# Patient Record
Sex: FEMALE | Race: WHITE | NOT HISPANIC OR LATINO | Employment: FULL TIME | URBAN - METROPOLITAN AREA
[De-identification: names, ages, dates, MRNs, and addresses within clinical notes are randomized per-mention and may not be internally consistent; named-entity substitution may affect disease eponyms.]

---

## 2020-01-29 ENCOUNTER — TELEPHONE (OUTPATIENT)
Dept: FAMILY MEDICINE CLINIC | Facility: CLINIC | Age: 50
End: 2020-01-29

## 2020-01-29 DIAGNOSIS — Z12.39 BREAST CANCER SCREENING: Primary | ICD-10-CM

## 2020-01-29 NOTE — TELEPHONE ENCOUNTER
Leann is an active pt of . She is currently scheduled and releases were signed at previous office. Pt is due for her routine mammogram. Is it possible to order Mammogram before new/est care appointment, which is in May? Please advise.

## 2020-02-11 RX ORDER — LEVOTHYROXINE SODIUM 0.03 MG/1
TABLET ORAL
Qty: 30 TABLET | OUTPATIENT
Start: 2020-02-11

## 2020-02-12 ENCOUNTER — TELEPHONE (OUTPATIENT)
Dept: FAMILY MEDICINE CLINIC | Facility: CLINIC | Age: 50
End: 2020-02-12

## 2020-02-14 RX ORDER — LEVOTHYROXINE SODIUM 0.03 MG/1
25 TABLET ORAL DAILY
Qty: 90 TABLET | Refills: 0 | Status: SHIPPED | OUTPATIENT
Start: 2020-02-14 | End: 2020-06-09

## 2020-02-14 RX ORDER — OLMESARTAN MEDOXOMIL AND HYDROCHLOROTHIAZIDE 40/25 40; 25 MG/1; MG/1
1 TABLET ORAL DAILY
Qty: 90 TABLET | Refills: 0 | Status: SHIPPED | OUTPATIENT
Start: 2020-02-14 | End: 2020-06-09

## 2020-02-14 RX ORDER — ALBUTEROL SULFATE 90 UG/1
2 AEROSOL, METERED RESPIRATORY (INHALATION) EVERY 4 HOURS PRN
Qty: 1 INHALER | Refills: 1 | Status: SHIPPED | OUTPATIENT
Start: 2020-02-14

## 2020-02-14 RX ORDER — MONTELUKAST SODIUM 10 MG/1
10 TABLET ORAL NIGHTLY
Qty: 90 TABLET | Refills: 0 | Status: SHIPPED | OUTPATIENT
Start: 2020-02-14 | End: 2020-06-09

## 2020-02-14 RX ORDER — OMEPRAZOLE 40 MG/1
40 CAPSULE, DELAYED RELEASE ORAL DAILY
Qty: 90 CAPSULE | Refills: 0 | Status: SHIPPED | OUTPATIENT
Start: 2020-02-14 | End: 2020-06-09

## 2020-03-31 ENCOUNTER — HOSPITAL ENCOUNTER (OUTPATIENT)
Dept: MAMMOGRAPHY | Facility: HOSPITAL | Age: 50
End: 2020-03-31

## 2020-05-18 ENCOUNTER — TELEMEDICINE (OUTPATIENT)
Dept: FAMILY MEDICINE CLINIC | Facility: CLINIC | Age: 50
End: 2020-05-18

## 2020-05-18 VITALS
HEIGHT: 65 IN | DIASTOLIC BLOOD PRESSURE: 84 MMHG | SYSTOLIC BLOOD PRESSURE: 126 MMHG | WEIGHT: 195 LBS | BODY MASS INDEX: 32.49 KG/M2

## 2020-05-18 DIAGNOSIS — E78.2 MIXED HYPERLIPIDEMIA: ICD-10-CM

## 2020-05-18 DIAGNOSIS — T78.40XS ALLERGIC STATE, SEQUELA: ICD-10-CM

## 2020-05-18 DIAGNOSIS — E03.8 OTHER SPECIFIED HYPOTHYROIDISM: ICD-10-CM

## 2020-05-18 DIAGNOSIS — I10 ESSENTIAL HYPERTENSION: Primary | ICD-10-CM

## 2020-05-18 DIAGNOSIS — J45.20 MILD INTERMITTENT ASTHMA WITHOUT COMPLICATION: ICD-10-CM

## 2020-05-18 PROBLEM — T78.40XA ALLERGIES: Status: ACTIVE | Noted: 2020-05-18

## 2020-05-18 PROCEDURE — 99214 OFFICE O/P EST MOD 30 MIN: CPT | Performed by: FAMILY MEDICINE

## 2020-05-18 RX ORDER — DEXTROAMPHETAMINE SACCHARATE, AMPHETAMINE ASPARTATE, DEXTROAMPHETAMINE SULFATE AND AMPHETAMINE SULFATE 5; 5; 5; 5 MG/1; MG/1; MG/1; MG/1
1 TABLET ORAL 2 TIMES DAILY
COMMUNITY
Start: 2020-05-06

## 2020-05-18 NOTE — PROGRESS NOTES
Sadiq Moctezuma is a 49 y.o. female.     Vitals:    05/18/20 1519   BP: 126/84        Chief Complaint   Patient presents with   • Hypothyroidism     Patient is needing to establish care. (pt is DOXI)         History of Present Illness    This appointment was converted to a video visit in accordance with CDC recommendations and guidelines given the current coronavirus pandemic    6-month follow-up on hypertension, hyperlipidemia, hypothyroidism, asthma/allergies    Last office visit with me at [facility] August 2019 for follow-up labs and medication refills.  At that time no changes were made to medications.    Currently, patient has been doing very well.  She has really worked hard to improve her diet, exercise and overall lifestyle.  In recent months she is lost about 13 inches!  At this point her hyperlipidemia has been diet controlled.    Her hypertension has been well controlled with Benicar.  Denies chest pain, shortness of air.  Due for labs.    Her hypothyroidism has been well controlled with levothyroxine 25 mcg every morning.  Compliant with medication.  Refill needed.  Labs due.    Even patient's allergies/asthma has been in excellent control!.  No recent sinus infections or exacerbations of asthma.  She has been well maintained on Allegra, Singulair, and PRN albuterol inhaler.    The following portions of the patient's history were reviewed and updated as appropriate: allergies, current medications, past family history, past medical history, past social history, past surgical history and problem list.    Review of Systems   Constitutional: Negative for unexpected weight gain and unexpected weight loss.   HENT: Negative for postnasal drip, rhinorrhea and sinus pressure.    Respiratory: Negative for cough and shortness of breath.    Cardiovascular: Negative for chest pain.       Objective   Physical Exam   Constitutional: She is oriented to person, place, and time. She appears well-developed. No  distress.   HENT:   Head: Normocephalic and atraumatic.   Pulmonary/Chest: Effort normal.   Neurological: She is alert and oriented to person, place, and time.   Psychiatric: She has a normal mood and affect. Her behavior is normal. Judgment and thought content normal.   Nursing note and vitals reviewed.       LABS/STUDIES --August 2019 normal CMP,     Procedures     Assessment/Plan   Leann was seen today for hypothyroidism.    Diagnoses and all orders for this visit:    Essential hypertension --stable.  No change in medication.  Will refill upon request Benicar 40/25 mg 1 p.o. daily.  Order placed for CMP.  Continue with healthy lifestyle    Mixed hyperlipidemia --uncontrolled.  Given patient's hypertension history goal LDL needs to be less than 130.  Order placed to recheck lipids if not at goal despite healthy diet, and exercise we will need to treat lipids with prescription medication.  Otherwise, continue to improve upon healthy diet and cardio exercise  -     Comprehensive Metabolic Panel; Future  -     Lipid Panel With LDL / HDL Ratio; Future    Other specified hypothyroidism --stable.  No change in medication.  Order placed to check TSH therapeutic then will refill levothyroxine 25 mcg 1 p.o. every morning upon request  -     TSH; Future    Mild intermittent asthma without complication -stable.  May continue albuterol MDI 2 puffs 4 times daily as needed, will refill upon request    Allergic state, sequela -stable.  Refill singular 10 mg 1 p.o. daily upon request      Video visit time 25 minutes           Return in about 8 weeks (around 7/13/2020), or Please change appointment to Friday, July 17, for Annual physical.     This was an audio and video enabled telemedicine encounter.

## 2020-05-18 NOTE — PATIENT INSTRUCTIONS
Continue current treatment plan.  Recommend low fat/low calorie diet and exercise greater than 150 minutes of cardio per week.

## 2020-06-09 RX ORDER — OLMESARTAN MEDOXOMIL AND HYDROCHLOROTHIAZIDE 40/25 40; 25 MG/1; MG/1
TABLET ORAL
Qty: 90 TABLET | Refills: 0 | Status: SHIPPED | OUTPATIENT
Start: 2020-06-09 | End: 2020-09-01

## 2020-06-09 RX ORDER — OMEPRAZOLE 40 MG/1
CAPSULE, DELAYED RELEASE ORAL
Qty: 90 CAPSULE | Refills: 0 | Status: SHIPPED | OUTPATIENT
Start: 2020-06-09 | End: 2020-07-17 | Stop reason: SDUPTHER

## 2020-06-09 RX ORDER — MONTELUKAST SODIUM 10 MG/1
TABLET ORAL
Qty: 90 TABLET | Refills: 0 | Status: SHIPPED | OUTPATIENT
Start: 2020-06-09 | End: 2020-09-01

## 2020-06-09 RX ORDER — LEVOTHYROXINE SODIUM 0.03 MG/1
TABLET ORAL
Qty: 90 TABLET | Refills: 0 | Status: SHIPPED | OUTPATIENT
Start: 2020-06-09 | End: 2020-09-01

## 2020-07-06 ENCOUNTER — RESULTS ENCOUNTER (OUTPATIENT)
Dept: FAMILY MEDICINE CLINIC | Facility: CLINIC | Age: 50
End: 2020-07-06

## 2020-07-06 DIAGNOSIS — E78.2 MIXED HYPERLIPIDEMIA: ICD-10-CM

## 2020-07-06 DIAGNOSIS — E03.8 OTHER SPECIFIED HYPOTHYROIDISM: ICD-10-CM

## 2020-07-15 LAB
ALBUMIN SERPL-MCNC: 4.8 G/DL (ref 3.8–4.8)
ALBUMIN/GLOB SERPL: 1.9 {RATIO} (ref 1.2–2.2)
ALP SERPL-CCNC: 65 IU/L (ref 39–117)
ALT SERPL-CCNC: 23 IU/L (ref 0–32)
AST SERPL-CCNC: 19 IU/L (ref 0–40)
BILIRUB SERPL-MCNC: 0.3 MG/DL (ref 0–1.2)
BUN SERPL-MCNC: 20 MG/DL (ref 6–24)
BUN/CREAT SERPL: 21 (ref 9–23)
CALCIUM SERPL-MCNC: 9.8 MG/DL (ref 8.7–10.2)
CHLORIDE SERPL-SCNC: 97 MMOL/L (ref 96–106)
CHOLEST SERPL-MCNC: 206 MG/DL (ref 100–199)
CO2 SERPL-SCNC: 23 MMOL/L (ref 20–29)
CREAT SERPL-MCNC: 0.96 MG/DL (ref 0.57–1)
GLOBULIN SER CALC-MCNC: 2.5 G/DL (ref 1.5–4.5)
GLUCOSE SERPL-MCNC: 89 MG/DL (ref 65–99)
HDLC SERPL-MCNC: 59 MG/DL
LDLC SERPL CALC-MCNC: 125 MG/DL (ref 0–99)
LDLC/HDLC SERPL: 2.1 RATIO (ref 0–3.2)
POTASSIUM SERPL-SCNC: 4.6 MMOL/L (ref 3.5–5.2)
PROT SERPL-MCNC: 7.3 G/DL (ref 6–8.5)
SODIUM SERPL-SCNC: 138 MMOL/L (ref 134–144)
TRIGL SERPL-MCNC: 110 MG/DL (ref 0–149)
TSH SERPL DL<=0.005 MIU/L-ACNC: 1.03 UIU/ML (ref 0.45–4.5)
VLDLC SERPL CALC-MCNC: 22 MG/DL (ref 5–40)

## 2020-07-17 ENCOUNTER — OFFICE VISIT (OUTPATIENT)
Dept: FAMILY MEDICINE CLINIC | Facility: CLINIC | Age: 50
End: 2020-07-17

## 2020-07-17 VITALS
BODY MASS INDEX: 32.62 KG/M2 | HEIGHT: 65 IN | OXYGEN SATURATION: 98 % | TEMPERATURE: 97.9 F | SYSTOLIC BLOOD PRESSURE: 100 MMHG | WEIGHT: 195.8 LBS | HEART RATE: 78 BPM | DIASTOLIC BLOOD PRESSURE: 62 MMHG

## 2020-07-17 DIAGNOSIS — Z01.419 WELL WOMAN EXAM WITH ROUTINE GYNECOLOGICAL EXAM: Primary | ICD-10-CM

## 2020-07-17 DIAGNOSIS — J45.20 MILD INTERMITTENT ASTHMA WITHOUT COMPLICATION: ICD-10-CM

## 2020-07-17 DIAGNOSIS — E03.8 OTHER SPECIFIED HYPOTHYROIDISM: ICD-10-CM

## 2020-07-17 DIAGNOSIS — Z87.891 PERSONAL HISTORY OF TOBACCO USE: ICD-10-CM

## 2020-07-17 DIAGNOSIS — K21.9 GASTROESOPHAGEAL REFLUX DISEASE WITHOUT ESOPHAGITIS: ICD-10-CM

## 2020-07-17 DIAGNOSIS — I10 ESSENTIAL HYPERTENSION: ICD-10-CM

## 2020-07-17 DIAGNOSIS — Z12.31 ENCOUNTER FOR SCREENING MAMMOGRAM FOR BREAST CANCER: ICD-10-CM

## 2020-07-17 PROBLEM — E78.2 MIXED HYPERLIPIDEMIA: Status: RESOLVED | Noted: 2020-05-18 | Resolved: 2020-07-17

## 2020-07-17 PROCEDURE — 99213 OFFICE O/P EST LOW 20 MIN: CPT | Performed by: FAMILY MEDICINE

## 2020-07-17 PROCEDURE — 99396 PREV VISIT EST AGE 40-64: CPT | Performed by: FAMILY MEDICINE

## 2020-07-17 RX ORDER — OMEPRAZOLE 40 MG/1
CAPSULE, DELAYED RELEASE ORAL
Qty: 180 CAPSULE | Refills: 1 | Status: SHIPPED | OUTPATIENT
Start: 2020-07-17 | End: 2021-02-05

## 2020-07-17 NOTE — PROGRESS NOTES
"Sadiq Moctezuma is a 49 y.o. female.     Vitals:    07/17/20 0947   BP: 100/62   Pulse: 78   Temp: 97.9 °F (36.6 °C)   SpO2: 98%        Chief Complaint   Patient presents with   • Annual Exam     general wellness S/P hysterectomy needs mammogram already had labs   • Heartburn     on Omeprazole 40 occaisionally has to take 2?   • Hypertension   • Asthma   • Hypothyroidism        History of Present Illness    Here for annual well woman exam and yearly follow-up for hypertension, hypothyroidism asthma, and GERD    Last office visit via telehealth just to get established and refills.  No changes made.  Overall, reports that she has been doing very well even despite the pandemic.  Maintaining social distancing, still working.    However, occasionally her GERD is uncontrolled and she will need to take a second omeprazole.  This has been like this times years.  She has had an EGD in the past which was compatible with GERD only.  She attributes this to eating something \"bad, such as a chili dog\".  Thus, she is running out of her omeprazole some months.  She would like the prescription changed to twice daily.    Hypertension well-controlled with Benicar.  Tolerates medication without side effects.  Denies chest pain, shortness of air.  Has been doing a great job with healthy well-balanced diet, recently started intermittent fasting.  Maintains regular cardio exercise greater than 150 minutes weekly.    Hypothyroidism well controlled with Synthroid.  Tolerates medication without side effects.  Compliant with dosing regimen.  Weight stable    Asthma well-controlled with as needed albuterol only.    Routine health maintenance/screening test:  Last mammogram over 1 year ago, needs referral  [facility], secondary to benign reasons  No reported hot flashes  Maintains healthy well-balanced diet, and cardio exercise greater than 150 minutes weekly  Family history negative for colon cancer or premature coronary artery disease  Quit " tobacco 6 years ago!  No hep C screen yet    The following portions of the patient's history were reviewed and updated as appropriate: allergies, current medications, past family history, past medical history, past social history, past surgical history and problem list.    Review of Systems   Constitutional: Negative for fever, unexpected weight gain and unexpected weight loss.   Respiratory: Negative for shortness of breath and wheezing.    Cardiovascular: Negative for chest pain.   Psychiatric/Behavioral: Negative for sleep disturbance and stress.       Objective   Physical Exam   Constitutional: She is oriented to person, place, and time. She appears well-developed and well-nourished.   HENT:   Head: Normocephalic and atraumatic.   Right Ear: External ear normal.   Left Ear: External ear normal.   Nose: Nose normal.   Mouth/Throat: Oropharynx is clear and moist.   Eyes: Pupils are equal, round, and reactive to light. Conjunctivae and EOM are normal.   Neck: Normal range of motion. Neck supple. No thyromegaly present.   Cardiovascular: Normal rate, regular rhythm, normal heart sounds and intact distal pulses.   Pulmonary/Chest: Effort normal and breath sounds normal. Right breast exhibits no inverted nipple, no mass, no nipple discharge, no skin change and no tenderness. Left breast exhibits no inverted nipple, no mass, no nipple discharge, no skin change and no tenderness.   Abdominal: Soft. Bowel sounds are normal. She exhibits no distension. There is no hepatosplenomegaly. There is no tenderness.   Genitourinary: Vagina normal. Right adnexum displays no mass and no tenderness. Left adnexum displays no mass and no tenderness.   Musculoskeletal: Normal range of motion. She exhibits no edema.   Lymphadenopathy:     She has no cervical adenopathy.   Neurological: She is alert and oriented to person, place, and time.   Skin: Skin is warm and dry. Capillary refill takes less than 2 seconds.   Psychiatric: She has a  normal mood and affect. Her behavior is normal. Judgment and thought content normal.   Nursing note and vitals reviewed.       LABS/STUDIES July 2020 , HDL 59, triglycerides 110, normal TSH, normal CMP    Procedures     Assessment/Plan   Leann was seen today for annual exam, heartburn, hypertension, asthma and hypothyroidism.    Diagnoses and all orders for this visit:    Well woman exam with routine gynecological exam within normal limits.  S/P breast and pelvic exam.  All screening up-to-date except for does need hepatitis C antibody screen and CBC.  Also, needs yearly mammogram, will schedule.  Continue with well-balanced healthy diet and regular cardio exercise greater than 150 minutes weekly  -     CBC & Differential  -     Hepatitis C Antibody    Encounter for screening mammogram for breast cancer schedule mammogram  -     Mammo Screening Bilateral With CAD; Future    Personal history of tobacco use quit tobacco 6 years ago!  Not eligible for CT lung cancer screening given age    Gastroesophageal reflux disease without esophagitis uncontrolled.  Will change prescription of omeprazole 40 mg daily to 40 mg twice daily, may need prior authorization?  Has had EGD in the past consistent with GERD only    Essential hypertension stable.  Continue Benicar 40/25 mg 1 p.o. daily.  Will refill upon request    Other specified hypothyroidism stable.  No change with Synthroid.  Will refill upon request    Mild intermittent asthma without complication stable.  Continue with as needed albuterol MDI will refill upon request    Other orders  -     omeprazole (priLOSEC) 40 MG capsule; Take one twice a day                 Return in about 1 year (around 7/17/2021) for Annual physical.

## 2020-07-17 NOTE — PATIENT INSTRUCTIONS
Continue current treatment plan.  Recommend low fat/low calorie diet and exercise greater than 150 minutes of cardio per week.   Get mammogram

## 2020-07-18 LAB
BASOPHILS # BLD AUTO: 0.07 10*3/MM3 (ref 0–0.2)
BASOPHILS NFR BLD AUTO: 0.8 % (ref 0–1.5)
EOSINOPHIL # BLD AUTO: 0.12 10*3/MM3 (ref 0–0.4)
EOSINOPHIL NFR BLD AUTO: 1.3 % (ref 0.3–6.2)
ERYTHROCYTE [DISTWIDTH] IN BLOOD BY AUTOMATED COUNT: 12.5 % (ref 12.3–15.4)
HCT VFR BLD AUTO: 39 % (ref 34–46.6)
HCV AB S/CO SERPL IA: <0.1 S/CO RATIO (ref 0–0.9)
HGB BLD-MCNC: 13 G/DL (ref 12–15.9)
IMM GRANULOCYTES # BLD AUTO: 0.01 10*3/MM3 (ref 0–0.05)
IMM GRANULOCYTES NFR BLD AUTO: 0.1 % (ref 0–0.5)
LYMPHOCYTES # BLD AUTO: 3.37 10*3/MM3 (ref 0.7–3.1)
LYMPHOCYTES NFR BLD AUTO: 36.2 % (ref 19.6–45.3)
MCH RBC QN AUTO: 29.5 PG (ref 26.6–33)
MCHC RBC AUTO-ENTMCNC: 33.3 G/DL (ref 31.5–35.7)
MCV RBC AUTO: 88.4 FL (ref 79–97)
MONOCYTES # BLD AUTO: 0.52 10*3/MM3 (ref 0.1–0.9)
MONOCYTES NFR BLD AUTO: 5.6 % (ref 5–12)
NEUTROPHILS # BLD AUTO: 5.23 10*3/MM3 (ref 1.7–7)
NEUTROPHILS NFR BLD AUTO: 56 % (ref 42.7–76)
NRBC BLD AUTO-RTO: 0 /100 WBC (ref 0–0.2)
PLATELET # BLD AUTO: 320 10*3/MM3 (ref 140–450)
RBC # BLD AUTO: 4.41 10*6/MM3 (ref 3.77–5.28)
WBC # BLD AUTO: 9.32 10*3/MM3 (ref 3.4–10.8)

## 2020-09-01 RX ORDER — LEVOTHYROXINE SODIUM 0.03 MG/1
TABLET ORAL
Qty: 90 TABLET | Refills: 3 | Status: SHIPPED | OUTPATIENT
Start: 2020-09-01 | End: 2021-09-07

## 2020-09-01 RX ORDER — MONTELUKAST SODIUM 10 MG/1
TABLET ORAL
Qty: 90 TABLET | Refills: 3 | Status: SHIPPED | OUTPATIENT
Start: 2020-09-01 | End: 2021-09-07

## 2020-09-01 RX ORDER — OLMESARTAN MEDOXOMIL AND HYDROCHLOROTHIAZIDE 40/25 40; 25 MG/1; MG/1
TABLET ORAL
Qty: 90 TABLET | Refills: 3 | Status: SHIPPED | OUTPATIENT
Start: 2020-09-01 | End: 2021-09-07

## 2020-11-18 ENCOUNTER — TRANSCRIBE ORDERS (OUTPATIENT)
Dept: ADMINISTRATIVE | Facility: HOSPITAL | Age: 50
End: 2020-11-18

## 2020-11-18 DIAGNOSIS — Z12.31 VISIT FOR SCREENING MAMMOGRAM: Primary | ICD-10-CM

## 2020-11-19 ENCOUNTER — HOSPITAL ENCOUNTER (OUTPATIENT)
Dept: MAMMOGRAPHY | Facility: HOSPITAL | Age: 50
Discharge: HOME | End: 2020-11-19
Admitting: FAMILY MEDICINE

## 2020-11-19 DIAGNOSIS — Z12.31 VISIT FOR SCREENING MAMMOGRAM: ICD-10-CM

## 2020-11-19 PROCEDURE — 77063 BREAST TOMOSYNTHESIS BI: CPT

## 2020-11-19 PROCEDURE — 77067 SCR MAMMO BI INCL CAD: CPT

## 2021-02-04 ENCOUNTER — TELEPHONE (OUTPATIENT)
Dept: FAMILY MEDICINE CLINIC | Facility: CLINIC | Age: 51
End: 2021-02-04

## 2021-02-04 NOTE — TELEPHONE ENCOUNTER
Caller: Leann Moctezuma    Relationship: Self    Best call back number: [phone]  What medication are you requesting: COUGH SYRUP    What are your current symptoms: COUGH, HEAD CONGESTION, POSITIVE COVID TEST ON 2/4/2021    How long have you been experiencing symptoms: 1 WEEK    If a prescription is needed, what is your preferred pharmacy and phone number: [place], [place] - [address] [place] [address] RD - [phone]  - [phone]

## 2021-02-05 DIAGNOSIS — U07.1 COVID-19 VIRUS DETECTED: Primary | ICD-10-CM

## 2021-02-05 RX ORDER — OMEPRAZOLE 40 MG/1
CAPSULE, DELAYED RELEASE ORAL
Qty: 180 CAPSULE | Refills: 1 | Status: SHIPPED | OUTPATIENT
Start: 2021-02-05 | End: 2021-08-11 | Stop reason: SDUPTHER

## 2021-02-05 NOTE — TELEPHONE ENCOUNTER
Okay to send me to sign a prescription for Hycodan 6 ounces 1 teaspoon p.o. every 8 hours as needed

## 2021-04-02 ENCOUNTER — BULK ORDERING (OUTPATIENT)
Dept: CASE MANAGEMENT | Facility: OTHER | Age: 51
End: 2021-04-02

## 2021-04-02 DIAGNOSIS — Z23 IMMUNIZATION DUE: ICD-10-CM

## 2021-08-05 RX ORDER — OMEPRAZOLE 40 MG/1
CAPSULE, DELAYED RELEASE ORAL
Qty: 180 CAPSULE | Refills: 1 | OUTPATIENT
Start: 2021-08-05

## 2021-08-05 NOTE — TELEPHONE ENCOUNTER
Caller: Leann Moctezuma    Relationship: Self    Best call back number: [phone] (H)    Medication needed:   Requested Prescriptions     Refused Prescriptions Disp Refills   • omeprazole (priLOSEC) 40 MG capsule [Pharmacy Med Name: omeprazole 40 mg capsule,delayed release] 180 capsule 1     Sig: TAKE 1 (ONE) CAPSULE BY MOUTH TWICE DAILY     Refused By: CASEY COTTON     Reason for Refusal: Patient needs an appointment   olmesartan-hydrochlorothiazide (BENICAR HCT) 40-25 MG per tablet  AND ANYTHING ELSE THAT IS DUE TO BE REFILLED    When do you need the refill by: ASAP    What additional details did the patient provide when requesting the medication: PATIENT CALLED TO REQUEST A MEDICATION REFILL ON HER MEDICATION. PATIENT STATES THAT HE HAS A 3 DAY SUPPLY LEFT. PATIENT WOULD ALSO LIKE TO START TAKING CHANTIX TO HELP HER STOP SMOKING.       Does the patient have less than a 3 day supply:  [] Yes  [x] No    What is the patient's preferred pharmacy: [facility] [address] - [phone]  - [phone]         THANKS

## 2021-08-06 RX ORDER — OMEPRAZOLE 40 MG/1
CAPSULE, DELAYED RELEASE ORAL
Qty: 180 CAPSULE | Refills: 1 | OUTPATIENT
Start: 2021-08-06

## 2021-08-11 ENCOUNTER — OFFICE VISIT (OUTPATIENT)
Dept: FAMILY MEDICINE CLINIC | Facility: CLINIC | Age: 51
End: 2021-08-11

## 2021-08-11 VITALS
OXYGEN SATURATION: 98 % | WEIGHT: 202.8 LBS | HEART RATE: 87 BPM | TEMPERATURE: 98.2 F | DIASTOLIC BLOOD PRESSURE: 74 MMHG | HEIGHT: 65 IN | SYSTOLIC BLOOD PRESSURE: 102 MMHG | BODY MASS INDEX: 33.79 KG/M2

## 2021-08-11 DIAGNOSIS — T78.40XS ALLERGY, SEQUELA: ICD-10-CM

## 2021-08-11 DIAGNOSIS — Z87.891 PERSONAL HISTORY OF TOBACCO USE: ICD-10-CM

## 2021-08-11 DIAGNOSIS — J01.00 ACUTE NON-RECURRENT MAXILLARY SINUSITIS: Primary | ICD-10-CM

## 2021-08-11 DIAGNOSIS — K21.9 GASTROESOPHAGEAL REFLUX DISEASE WITHOUT ESOPHAGITIS: ICD-10-CM

## 2021-08-11 DIAGNOSIS — J45.20 MILD INTERMITTENT ASTHMA WITHOUT COMPLICATION: ICD-10-CM

## 2021-08-11 PROBLEM — Z12.31 ENCOUNTER FOR SCREENING MAMMOGRAM FOR BREAST CANCER: Status: RESOLVED | Noted: 2020-07-17 | Resolved: 2021-08-11

## 2021-08-11 PROBLEM — Z01.419 WELL WOMAN EXAM WITH ROUTINE GYNECOLOGICAL EXAM: Status: RESOLVED | Noted: 2020-07-17 | Resolved: 2021-08-11

## 2021-08-11 PROCEDURE — 99406 BEHAV CHNG SMOKING 3-10 MIN: CPT | Performed by: FAMILY MEDICINE

## 2021-08-11 PROCEDURE — 99214 OFFICE O/P EST MOD 30 MIN: CPT | Performed by: FAMILY MEDICINE

## 2021-08-11 RX ORDER — AMOXICILLIN AND CLAVULANATE POTASSIUM 875; 125 MG/1; MG/1
1 TABLET, FILM COATED ORAL 2 TIMES DAILY
Qty: 20 TABLET | Refills: 0 | Status: SHIPPED | OUTPATIENT
Start: 2021-08-11 | End: 2022-01-03 | Stop reason: SDUPTHER

## 2021-08-11 RX ORDER — OMEPRAZOLE 40 MG/1
CAPSULE, DELAYED RELEASE ORAL
Qty: 60 CAPSULE | Refills: 5 | Status: SHIPPED | OUTPATIENT
Start: 2021-08-11 | End: 2021-10-13 | Stop reason: SDUPTHER

## 2021-08-11 RX ORDER — VARENICLINE TARTRATE 1 MG/1
1 TABLET, FILM COATED ORAL DAILY
Qty: 30 TABLET | Refills: 3 | Status: SHIPPED | OUTPATIENT
Start: 2021-08-11 | End: 2022-09-16

## 2021-08-11 NOTE — PROGRESS NOTES
Sadiq Moctezuma is a 50 y.o. female.     Vitals:    08/11/21 1343   BP: 102/74   Pulse: 87   Temp: 98.2 °F (36.8 °C)   SpO2: 98%        Chief Complaint   Patient presents with   • Allergies     upper respiratory symptoms colored drainage   • Nicotine Dependence     started smoking again wants chantix   • Heartburn        History of Present Illness    Yearly follow-up on allergies, asthma, GERD, and complaints of possible sinus infection and desire to quit smoking again.    LOV with me approximately 1 year ago for wellness exam, labs, and med refills.  No changes made at that visit as all labs are WNL.    Overall, doing very well all considering.  However, does complain of a possible sinus infection today.  She states she started about 3 weeks ago with a lot of congestion, drainage and sinus pressure.  At first she related this to buying a new house and remodeling (knocking down a wall, lots of cat hair, etc.) yet her symptoms have not gone away and are no better.  She describes the nasal congestion as thick and yellow.  Denies fever, nausea, vomiting, loss of taste or smell.  She has been compliant with her Singulair.  Still only uses her albuterol MDI on an as-needed basis.    Otherwise, just needs med refills.  Still takes Prilosec twice daily, EGD done in past and consistent with GERD only.  Compliant with and tolerates all medications without side effects.  Unfortunately, she did start smoking again approximately 1 year ago.  This first started as a social thing yet unfortunately she is back to smoking on a daily basis.  Previously had quit for approximately 6 years.  She did use Chantix in the past and this was successful.  She does have a strong desire to quit smoking now.  Would like to quit smoking by Labor Day.    The following portions of the patient's history were reviewed and updated as appropriate: allergies, current medications, past family history, past medical history, past social history,  past surgical history and problem list.    Review of Systems   Constitutional: Negative for fever, unexpected weight gain and unexpected weight loss.   HENT: Positive for congestion and postnasal drip.    Respiratory: Positive for cough. Negative for shortness of breath.    Cardiovascular: Negative for chest pain.       Objective   Physical Exam  Vitals and nursing note reviewed.   Constitutional:       Appearance: Normal appearance. She is well-developed. She is obese.   HENT:      Head: Normocephalic and atraumatic.      Comments: Oropharynx --lots of drainage/phlegm only     Right Ear: Tympanic membrane, ear canal and external ear normal.      Left Ear: There is impacted cerumen.      Ears:      Comments: Left ear --partial cerumen impaction     Nose: Nose normal.   Eyes:      Conjunctiva/sclera: Conjunctivae normal.      Pupils: Pupils are equal, round, and reactive to light.   Neck:      Thyroid: No thyromegaly.   Cardiovascular:      Rate and Rhythm: Normal rate and regular rhythm.      Heart sounds: Normal heart sounds. No murmur heard.     Pulmonary:      Effort: Pulmonary effort is normal.      Breath sounds: Normal breath sounds.   Abdominal:      General: Abdomen is flat. Bowel sounds are normal. There is no distension.      Palpations: Abdomen is soft. There is no hepatomegaly, splenomegaly or mass.      Tenderness: There is no abdominal tenderness. There is no guarding or rebound.      Hernia: No hernia is present.   Musculoskeletal:         General: Normal range of motion.      Cervical back: Normal range of motion and neck supple.      Right lower leg: No edema.      Left lower leg: No edema.   Lymphadenopathy:      Cervical: No cervical adenopathy.   Skin:     General: Skin is warm.   Neurological:      General: No focal deficit present.      Mental Status: She is alert.   Psychiatric:         Mood and Affect: Mood normal.         Behavior: Behavior normal.         Thought Content: Thought content  normal.         Judgment: Judgment normal.          LABS/STUDIES  -July 2020 --all labs WNL    Procedures     Assessment/Plan   Diagnoses and all orders for this visit:    1. Acute non-recurrent maxillary sinusitis (Primary)  -acute diagnosis  Start Augmentin 875 mg twice daily x10 days  May take OTC Claritin-D as needed    2. Mild intermittent asthma without complication  -stable.  No change of medication.  Continue singular 10 mg daily, will refill upon request  Continue albuterol MDI as needed, will refill upon request  Needs to DC tobacco!  See plan below.    3. Allergy, sequela  -stable.  Continue singular 10 mg daily, will refill upon request    4. Gastroesophageal reflux disease without esophagitis  -stable.  Continue/refill Prilosec 40 mg 1 p.o. twice daily as needed    5. Personal history of tobacco use  -needs to DC tobacco!  Ready to do so.  Start Chantix starter pack as directed below then proceed with Chantix 1 mg daily until follow-up appointment in approximately 3 months.  Leann Self  reports that she has quit smoking. She has never used smokeless tobacco.. I have educated her on the risk of diseases from using tobacco products such as cancer, COPD and heart disease.     I advised her to quit and she is willing to quit. We have discussed the following method/s for tobacco cessation:  Prescription Medicaiton.  Together we have set a quit date for 3 weeks from today.  She will follow up with me in 2 months or sooner to check on her progress.    I spent 4 minutes counseling the patient.         Other orders  -     omeprazole (priLOSEC) 40 MG capsule; TAKE 1 (ONE) TABLET BY MOUTH TWICE DAILY  Dispense: 60 capsule; Refill: 5  -     varenicline (CHANTIX VONDA) 0.5 MG X 11 & 1 MG X 42 tablet; Take 0.5 mg one daily on days 1-3 and and 0.5 mg twice daily on days 4-7.Then 1 mg twice daily for a total of 12 weeks.  Dispense: 28 tablet; Refill: 0  -     varenicline (CHANTIX) 1 MG tablet; Take 1 tablet by mouth  Daily.  Dispense: 30 tablet; Refill: 3  -     amoxicillin-clavulanate (Augmentin) 875-125 MG per tablet; Take 1 tablet by mouth 2 (Two) Times a Day.  Dispense: 20 tablet; Refill: 0                 Wore goggles and face mask during entire visit.    Return if symptoms worsen or fail to improve, for Keep physical appointment --September?  Or October?.

## 2021-08-11 NOTE — PATIENT INSTRUCTIONS
Stop smoking!  Start Chantix as prescribed    Start Augmentin twice daily x10 days  Continue current treatment plan.

## 2021-09-07 RX ORDER — MONTELUKAST SODIUM 10 MG/1
TABLET ORAL
Qty: 90 TABLET | Refills: 0 | Status: SHIPPED | OUTPATIENT
Start: 2021-09-07 | End: 2021-10-13 | Stop reason: SDUPTHER

## 2021-09-07 RX ORDER — OLMESARTAN MEDOXOMIL AND HYDROCHLOROTHIAZIDE 40/25 40; 25 MG/1; MG/1
TABLET ORAL
Qty: 90 TABLET | Refills: 0 | Status: SHIPPED | OUTPATIENT
Start: 2021-09-07 | End: 2021-10-13 | Stop reason: SDUPTHER

## 2021-09-07 RX ORDER — LEVOTHYROXINE SODIUM 0.03 MG/1
TABLET ORAL
Qty: 90 TABLET | Refills: 0 | Status: SHIPPED | OUTPATIENT
Start: 2021-09-07 | End: 2021-10-13

## 2021-09-24 ENCOUNTER — TELEPHONE (OUTPATIENT)
Dept: FAMILY MEDICINE CLINIC | Facility: CLINIC | Age: 51
End: 2021-09-24

## 2021-09-24 DIAGNOSIS — E03.8 OTHER SPECIFIED HYPOTHYROIDISM: ICD-10-CM

## 2021-09-24 DIAGNOSIS — I10 ESSENTIAL HYPERTENSION: Primary | ICD-10-CM

## 2021-09-24 DIAGNOSIS — E78.2 MIXED HYPERLIPIDEMIA: ICD-10-CM

## 2021-09-24 NOTE — TELEPHONE ENCOUNTER
PATIENT CALLED IN TO SCHEDULE LAB, BUT THERE ARE NO ACTIVE LAB ORDERS.  PLEASE ADVISE, THANK YOU.

## 2021-10-13 ENCOUNTER — OFFICE VISIT (OUTPATIENT)
Dept: FAMILY MEDICINE CLINIC | Facility: CLINIC | Age: 51
End: 2021-10-13

## 2021-10-13 VITALS
OXYGEN SATURATION: 98 % | HEART RATE: 64 BPM | SYSTOLIC BLOOD PRESSURE: 100 MMHG | WEIGHT: 205.2 LBS | DIASTOLIC BLOOD PRESSURE: 60 MMHG | BODY MASS INDEX: 34.19 KG/M2 | TEMPERATURE: 97.7 F | HEIGHT: 65 IN

## 2021-10-13 DIAGNOSIS — I10 ESSENTIAL HYPERTENSION: ICD-10-CM

## 2021-10-13 DIAGNOSIS — E03.8 OTHER SPECIFIED HYPOTHYROIDISM: ICD-10-CM

## 2021-10-13 DIAGNOSIS — K21.9 GASTROESOPHAGEAL REFLUX DISEASE WITHOUT ESOPHAGITIS: ICD-10-CM

## 2021-10-13 DIAGNOSIS — Z12.31 ENCOUNTER FOR SCREENING MAMMOGRAM FOR BREAST CANCER: ICD-10-CM

## 2021-10-13 DIAGNOSIS — Z12.11 ENCOUNTER FOR SCREENING COLONOSCOPY: ICD-10-CM

## 2021-10-13 DIAGNOSIS — Z01.419 WELL WOMAN EXAM WITH ROUTINE GYNECOLOGICAL EXAM: Primary | ICD-10-CM

## 2021-10-13 DIAGNOSIS — J45.20 MILD INTERMITTENT ASTHMA WITHOUT COMPLICATION: ICD-10-CM

## 2021-10-13 DIAGNOSIS — E78.5 HYPERLIPIDEMIA, UNSPECIFIED HYPERLIPIDEMIA TYPE: ICD-10-CM

## 2021-10-13 PROCEDURE — 99396 PREV VISIT EST AGE 40-64: CPT | Performed by: FAMILY MEDICINE

## 2021-10-13 PROCEDURE — 99214 OFFICE O/P EST MOD 30 MIN: CPT | Performed by: FAMILY MEDICINE

## 2021-10-13 RX ORDER — OLMESARTAN MEDOXOMIL AND HYDROCHLOROTHIAZIDE 40/25 40; 25 MG/1; MG/1
1 TABLET ORAL DAILY
Qty: 90 TABLET | Refills: 1 | Status: SHIPPED | OUTPATIENT
Start: 2021-10-13 | End: 2022-10-20

## 2021-10-13 RX ORDER — LEVOTHYROXINE SODIUM 0.03 MG/1
25 TABLET ORAL DAILY
Qty: 90 TABLET | Refills: 1 | Status: CANCELLED | OUTPATIENT
Start: 2021-10-13

## 2021-10-13 RX ORDER — MONTELUKAST SODIUM 10 MG/1
10 TABLET ORAL NIGHTLY
Qty: 90 TABLET | Refills: 1 | Status: SHIPPED | OUTPATIENT
Start: 2021-10-13 | End: 2022-10-20

## 2021-10-13 RX ORDER — LEVOTHYROXINE SODIUM 0.05 MG/1
50 TABLET ORAL DAILY
Qty: 90 TABLET | Refills: 0 | Status: SHIPPED | OUTPATIENT
Start: 2021-10-13 | End: 2022-02-10

## 2021-10-13 RX ORDER — OMEPRAZOLE 40 MG/1
CAPSULE, DELAYED RELEASE ORAL
Qty: 60 CAPSULE | Refills: 5 | Status: SHIPPED | OUTPATIENT
Start: 2021-10-13 | End: 2022-01-20 | Stop reason: SDUPTHER

## 2021-10-13 NOTE — PATIENT INSTRUCTIONS
Increase Synthroid to 50 mcg every morning    Recommend low fat/low calorie diet and exercise greater than 150 minutes of cardio per week.     Recheck labs prior to follow-up with me in 3 months    Get mammogram, and C-scope and referral to GI for uncontrolled GERD

## 2021-10-13 NOTE — PROGRESS NOTES
"Sadiq Moctezuma is a 50 y.o. female.     Vitals:    10/13/21 1342   BP: 100/60   Pulse: 64   Temp: 97.7 °F (36.5 °C)   SpO2: 98%        Chief Complaint   Patient presents with   • Annual Exam     YEARLY WELLNESS WITH PELVIC S/P HYSTERECTOMY NEEDS MAMMOGRAM NEVER HAD COLONOSCOPY   • Hypothyroidism   • Hypertension   • GI Problem   • Hyperlipidemia        History of Present Illness    Presents for Annual well woman exam   And  6 month F/U on Hypothyroidism, Hyperlipidemia, HTN, and allergic asthma    LOV with me in Aug for same day/acute care for Sinusitis and Tobacco cessation.  At that visit, she was treated with an antibiotic and Chantix was prescribed.  She did quit smoking on 8/29/21!!    Overall, feels better yet still having issues with her \"gut.\"  On and off upper abd pain x months. Taking her Prilosec BID and still with GERD.  Aware of the weight gain and admits to poor diet and lack of exercise.   No NSAId use. Denies N,V, and diarrhea.     Otherwise, no complaints today.  Needs all refills and here to follow up on labs from last week.  Compliant with and tolerates all meds without side effects.    Routine Health Maintenance/screening:  RADHA, benign reasons  Mammo >2 years ago, normal  No colorectal screening yet.  Vaccines up to date except for her Influenza vaccine   Recently quit tobacco, social alcohol only  Tries to maintain a healthy well balanced diet and sporadic exercise  Family history negative for premature CAD, colon cancer, breast cancer    The following portions of the patient's history were reviewed and updated as appropriate: allergies, current medications, past family history, past medical history, past social history, past surgical history and problem list.    Review of Systems   Constitutional: Negative for fever, unexpected weight gain and unexpected weight loss.   Respiratory: Negative for cough and shortness of breath.    Cardiovascular: Negative for chest pain.       Objective "   Physical Exam  Vitals and nursing note reviewed. Exam conducted with a chaperone present.   Constitutional:       Appearance: Normal appearance. She is well-developed. She is obese.   HENT:      Head: Normocephalic and atraumatic.      Right Ear: External ear normal.      Left Ear: External ear normal.      Nose: Nose normal.   Eyes:      Conjunctiva/sclera: Conjunctivae normal.      Pupils: Pupils are equal, round, and reactive to light.   Neck:      Thyroid: No thyromegaly.      Vascular: No carotid bruit.   Cardiovascular:      Rate and Rhythm: Normal rate and regular rhythm.      Pulses: Normal pulses.      Heart sounds: Normal heart sounds. No murmur heard.      Pulmonary:      Effort: Pulmonary effort is normal.      Breath sounds: Normal breath sounds.   Chest:   Breasts:      Right: Normal. No inverted nipple, mass, nipple discharge, tenderness, axillary adenopathy or supraclavicular adenopathy.      Left: Normal. No inverted nipple, mass, nipple discharge, tenderness, axillary adenopathy or supraclavicular adenopathy.       Abdominal:      General: Abdomen is flat. Bowel sounds are normal. There is no distension.      Palpations: Abdomen is soft. There is no hepatomegaly, splenomegaly or mass.      Tenderness: There is no abdominal tenderness.      Hernia: No hernia is present. There is no hernia in the left inguinal area or right inguinal area.   Genitourinary:     General: Normal vulva.      Exam position: Lithotomy position.      Vagina: Normal.      Adnexa: Right adnexa normal and left adnexa normal.        Right: No mass, tenderness or fullness.          Left: No mass, tenderness or fullness.        Rectum: Normal. Guaiac result negative. No mass.      Comments: S/P RADHA  Musculoskeletal:         General: Normal range of motion.      Cervical back: Normal range of motion and neck supple.      Right lower leg: No edema.      Left lower leg: No edema.   Lymphadenopathy:      Cervical: No cervical  adenopathy.      Upper Body:      Right upper body: No supraclavicular or axillary adenopathy.      Left upper body: No supraclavicular or axillary adenopathy.      Lower Body: No right inguinal adenopathy. No left inguinal adenopathy.   Skin:     General: Skin is warm and dry.      Capillary Refill: Capillary refill takes less than 2 seconds.      Comments: No dysplastic nevi or abnormal lesions   Neurological:      General: No focal deficit present.      Mental Status: She is alert and oriented to person, place, and time.   Psychiatric:         Mood and Affect: Mood normal.         Behavior: Behavior normal.         Thought Content: Thought content normal.         Judgment: Judgment normal.          LABS/STUDIES  - Oct 2021 -- TSH 3.7 (1.03), , HDL 52, CBC and CMP normal    Procedures     Assessment/Plan   Diagnoses and all orders for this visit:    1. Well woman exam with routine gynecological exam (Primary)  - S/P Breast/Pelvic done, wnl.   Needed screening includes: Mammo, Colorectal screen (choses Cscope,) and Influenza vaccine (declines despite recommendations.) see orders below..  Needs weight loss --- Needs low-carb/low calorie/low cholesterol diet and increase cardio exercise to greater than 150 minutes weekly    2. Encounter for screening mammogram for breast cancer  -     Mammo Screening Bilateral With CAD; Future    3. Encounter for screening colonoscopy  -     Ambulatory Referral For Screening Colonoscopy    4. Gastroesophageal reflux disease without esophagitis  - uncontrolled.  To GI, needs an EGD?  Cont to avoid NSaids and cont her PPI/Prilosec  -     Ambulatory Referral to Gastroenterology    5. Hyperlipidemia, unspecified hyperlipidemia type  -- uncontrolled.  Will try to adjust thyroid med first then recheck lipids  If LDL remains > than 130 given HTN will need to start Rx  Needs low-carb/low calorie/low cholesterol diet and increase cardio exercise to greater than 150 minutes  weekly  Recheck lipids in 3 months  -     Lipid Panel With LDL / HDL Ratio; Future    6. Other specified hypothyroidism  - uncontrolled.   Increase Synthroid to 50 mcg q am  Recheck in 2-3 months  -     TSH; Future    7. Essential hypertension  - stable. No change with meds  Refill Benicar as directed below.     8. Mild intermittent asthma without complication  - stable  Refill Singulair 10 mg q day .    Other orders  -     montelukast (SINGULAIR) 10 MG tablet; Take 1 tablet by mouth Every Night.  Dispense: 90 tablet; Refill: 1  -     olmesartan-hydrochlorothiazide (BENICAR HCT) 40-25 MG per tablet; Take 1 tablet by mouth Daily.  Dispense: 90 tablet; Refill: 1  -     omeprazole (priLOSEC) 40 MG capsule; TAKE 1 (ONE) TABLET BY MOUTH TWICE DAILY  Dispense: 60 capsule; Refill: 5  -     levothyroxine (Synthroid) 50 MCG tablet; Take 1 tablet by mouth Daily.  Dispense: 90 tablet; Refill: 0                 Wore goggles and face mask during entire visit.    Return in about 3 months (around 1/13/2022) for Recheck, labs first then appointment with me.

## 2021-11-03 ENCOUNTER — TELEPHONE (OUTPATIENT)
Dept: GASTROENTEROLOGY | Facility: CLINIC | Age: 51
End: 2021-11-03

## 2021-11-03 NOTE — TELEPHONE ENCOUNTER
Colonoscopy Screening--No personal history polyps--No family history of polyps or colon cancer--No blood thinners--Medication:       albuterol sulfate  (90 Base) MCG/ACT inhaler    amoxicillin-clavulanate (Augmentin) 875-125 MG per tablet    amphetamine-dextroamphetamine (ADDERALL) 20 MG tablet    Fexofenadine HCl (ALLEGRA PO)    HYDROcodone-homatropine (HYCODAN) 5-1.5 MG/5ML syrup    levothyroxine (Synthroid) 50 MCG tablet    montelukast (SINGULAIR) 10 MG tablet    olmesartan-hydrochlorothiazide (BENICAR HCT) 40-25 MG per tablet    omeprazole (priLOSEC) 40 MG capsule    varenicline (CHANTIX VONDA) 0.5 MG X 11 & 1 MG X 42 tablet    varenicline (CHANTIX) 1 MG tablet      Oa form scan in media

## 2021-11-05 ENCOUNTER — PREP FOR SURGERY (OUTPATIENT)
Dept: OTHER | Facility: HOSPITAL | Age: 51
End: 2021-11-05

## 2021-11-05 DIAGNOSIS — Z12.11 ENCOUNTER FOR SCREENING FOR MALIGNANT NEOPLASM OF COLON: Primary | ICD-10-CM

## 2021-11-16 ENCOUNTER — TELEPHONE (OUTPATIENT)
Dept: GASTROENTEROLOGY | Facility: CLINIC | Age: 51
End: 2021-11-16

## 2021-11-16 ENCOUNTER — OFFICE VISIT (OUTPATIENT)
Dept: GASTROENTEROLOGY | Facility: CLINIC | Age: 51
End: 2021-11-16

## 2021-11-16 VITALS — HEIGHT: 65 IN | BODY MASS INDEX: 34.82 KG/M2 | WEIGHT: 209 LBS | TEMPERATURE: 97.5 F

## 2021-11-16 DIAGNOSIS — K21.9 GASTROESOPHAGEAL REFLUX DISEASE WITHOUT ESOPHAGITIS: Primary | ICD-10-CM

## 2021-11-16 PROCEDURE — 99203 OFFICE O/P NEW LOW 30 MIN: CPT | Performed by: INTERNAL MEDICINE

## 2021-11-16 NOTE — PROGRESS NOTES
Chief Complaint   Patient presents with   • Heartburn   • Colonoscopy   • Endoscopy     Subjective     HPI  Leann Moctezuma is a 50 y.o. female who presents today for new office visit  Heartburn and dysphagia  In need of screening colonoscopy  Epigastric pain that does not radiate worse after eating better while fasting, associated with bloating occasional nausea  Position change relieves the bloating    Objective   Vitals:    11/16/21 1026   Temp: 97.5 °F (36.4 °C)       Physical Exam  Vitals and nursing note reviewed.   Constitutional:       Appearance: She is well-developed.   HENT:      Head: Normocephalic and atraumatic.   Eyes:      Pupils: Pupils are equal, round, and reactive to light.   Cardiovascular:      Rate and Rhythm: Normal rate and regular rhythm.      Heart sounds: Normal heart sounds.   Pulmonary:      Effort: Pulmonary effort is normal.      Breath sounds: Normal breath sounds.   Abdominal:      General: Bowel sounds are normal. There is no distension or abdominal bruit.      Palpations: Abdomen is soft. Abdomen is not rigid. There is no shifting dullness, fluid wave or mass.      Tenderness: There is no abdominal tenderness. There is no guarding. Negative signs include Esquivel's sign.      Hernia: No hernia is present. There is no hernia in the ventral area.   Genitourinary:     Rectum: Normal. No mass, tenderness, anal fissure, external hemorrhoid or internal hemorrhoid.   Musculoskeletal:         General: Normal range of motion.   Skin:     General: Skin is warm and dry.   Neurological:      Mental Status: She is alert and oriented to person, place, and time.   Psychiatric:         Behavior: Behavior normal.         Thought Content: Thought content normal.         Judgment: Judgment normal.       The following data was reviewed by: Jasson Springer MD on 11/16/2021:  Common labs    Common Labsle 10/5/21 10/5/21 10/5/21    1027 1027 1027   Glucose 94     BUN 14     Creatinine 0.85     eGFR Non African  no edema, regular rate and rhythm Am 71     eGFR African Am 86     Sodium 137     Potassium 4.2     Chloride 100     Calcium 9.7     Total Protein 6.7     Albumin 4.40     Total Bilirubin 0.3     Alkaline Phosphatase 77     AST (SGOT) 24     ALT (SGPT) 28     WBC   9.90   Hemoglobin   13.9   Hematocrit   41.9   Platelets   352   Total Cholesterol  239 (A)    Triglycerides  128    HDL Cholesterol  52    LDL Cholesterol   164 (A)    (A) Abnormal value       Comments are available for some flowsheets but are not being displayed.           CMP    CMP 10/5/21   Glucose 94   BUN 14   Creatinine 0.85   eGFR Non  Am 71   eGFR  Am 86   Sodium 137   Potassium 4.2   Chloride 100   Calcium 9.7   Total Protein 6.7   Albumin 4.40   Globulin 2.3   Total Bilirubin 0.3   Alkaline Phosphatase 77   AST (SGOT) 24   ALT (SGPT) 28      Comments are available for some flowsheets but are not being displayed.           CBC    CBC 10/5/21   WBC 9.90   RBC 4.70   Hemoglobin 13.9   Hematocrit 41.9   MCV 89.1   MCH 29.6   MCHC 33.2   RDW 12.8   Platelets 352           CBC w/diff    CBC w/Diff 10/5/21   WBC 9.90   RBC 4.70   Hemoglobin 13.9   Hematocrit 41.9   MCV 89.1   MCH 29.6   MCHC 33.2   RDW 12.8   Platelets 352   Neutrophil Rel % 52.6   Lymphocyte Rel % 38.7   Monocyte Rel % 6.1   Eosinophil Rel % 1.7   Basophil Rel % 0.7               Assessment/Plan   Assessment:     Epigastric pain with upper abdominal bloating  GERD with dysphagia*  In need of screening colonoscopy      Plan:     Schedule EGD and colonoscopy for the above issues  If EGD is within normal limits consider ultrasound with HIDA scan  Continue PPI    I spent 30 minutes caring for Leann on this date of service. This time includes time spent by me in the following activities:preparing for the visit, reviewing tests, obtaining and/or reviewing a separately obtained history, performing a medically appropriate examination and/or evaluation , counseling and educating the patient/family/caregiver,  ordering medications, tests, or procedures, referring and communicating with other health care professionals , documenting information in the medical record, independently interpreting results and communicating that information with the patient/family/caregiver and care coordination    Jasson Springer MD  [facility]  [address] [place] [address]  Office: [phone]

## 2021-11-16 NOTE — TELEPHONE ENCOUNTER
NATALIA patient in office for EGD/CS  . Scheduled at [facility] for 01/20/2022 with arrival time of 7:30am  . Prep packet given to patient in office. Patient also advised that his/her arrival time may fluctuate based on [facility] guidelines. NATALIA Lott--Clifford .

## 2021-11-17 LAB
ENDOMYSIUM IGA SER QL: NEGATIVE
GLIADIN PEPTIDE IGA SER-ACNC: 3 UNITS (ref 0–19)
GLIADIN PEPTIDE IGG SER-ACNC: 2 UNITS (ref 0–19)
IGA SERPL-MCNC: 233 MG/DL (ref 87–352)
TTG IGA SER-ACNC: <2 U/ML (ref 0–3)
TTG IGG SER-ACNC: <2 U/ML (ref 0–5)

## 2021-11-19 ENCOUNTER — TELEPHONE (OUTPATIENT)
Dept: GASTROENTEROLOGY | Facility: CLINIC | Age: 51
End: 2021-11-19

## 2021-11-19 NOTE — TELEPHONE ENCOUNTER
----- Message from Jasson Springer MD sent at 11/19/2021 10:46 AM EST -----  Celiac panel negative

## 2021-12-09 ENCOUNTER — HOSPITAL ENCOUNTER (OUTPATIENT)
Dept: MAMMOGRAPHY | Facility: HOSPITAL | Age: 51
Discharge: HOME | End: 2021-12-09
Admitting: FAMILY MEDICINE

## 2021-12-09 DIAGNOSIS — Z12.31 ENCOUNTER FOR SCREENING MAMMOGRAM FOR BREAST CANCER: ICD-10-CM

## 2021-12-09 PROCEDURE — 77063 BREAST TOMOSYNTHESIS BI: CPT

## 2021-12-09 PROCEDURE — 77067 SCR MAMMO BI INCL CAD: CPT

## 2021-12-20 DIAGNOSIS — R92.8 ABNORMALITY OF RIGHT BREAST ON SCREENING MAMMOGRAM: Primary | ICD-10-CM

## 2021-12-22 ENCOUNTER — TELEPHONE (OUTPATIENT)
Dept: FAMILY MEDICINE CLINIC | Facility: CLINIC | Age: 51
End: 2021-12-22

## 2021-12-22 NOTE — TELEPHONE ENCOUNTER
Bernarda from [facility] called on behalf of the pt. She states that although the patient does have orders in, they need to be signed by Dr. Ricks.     Pt is having her procedure tomorrow 12/23/2021    The orders that need to be signed are:    Ultra sound     Diagnostic mammogram

## 2021-12-23 ENCOUNTER — APPOINTMENT (OUTPATIENT)
Dept: ULTRASOUND IMAGING | Facility: HOSPITAL | Age: 51
End: 2021-12-23

## 2021-12-23 ENCOUNTER — APPOINTMENT (OUTPATIENT)
Dept: MAMMOGRAPHY | Facility: HOSPITAL | Age: 51
End: 2021-12-23

## 2022-01-03 ENCOUNTER — OFFICE VISIT (OUTPATIENT)
Dept: FAMILY MEDICINE CLINIC | Facility: CLINIC | Age: 52
End: 2022-01-03

## 2022-01-03 VITALS
SYSTOLIC BLOOD PRESSURE: 110 MMHG | HEART RATE: 74 BPM | BODY MASS INDEX: 35.96 KG/M2 | HEIGHT: 65 IN | DIASTOLIC BLOOD PRESSURE: 80 MMHG | TEMPERATURE: 97.5 F | WEIGHT: 215.8 LBS | OXYGEN SATURATION: 98 %

## 2022-01-03 DIAGNOSIS — J01.10 ACUTE NON-RECURRENT FRONTAL SINUSITIS: Primary | ICD-10-CM

## 2022-01-03 DIAGNOSIS — Z11.52 ENCOUNTER FOR SCREENING FOR COVID-19: ICD-10-CM

## 2022-01-03 PROBLEM — Z86.16 HISTORY OF COVID-19: Status: ACTIVE | Noted: 2022-01-03

## 2022-01-03 PROCEDURE — 96372 THER/PROPH/DIAG INJ SC/IM: CPT | Performed by: FAMILY MEDICINE

## 2022-01-03 PROCEDURE — 99213 OFFICE O/P EST LOW 20 MIN: CPT | Performed by: FAMILY MEDICINE

## 2022-01-03 RX ORDER — METHYLPREDNISOLONE SODIUM SUCCINATE 40 MG/ML
80 INJECTION, POWDER, LYOPHILIZED, FOR SOLUTION INTRAMUSCULAR; INTRAVENOUS ONCE
Status: COMPLETED | OUTPATIENT
Start: 2022-01-03 | End: 2022-01-03

## 2022-01-03 RX ORDER — AMOXICILLIN AND CLAVULANATE POTASSIUM 875; 125 MG/1; MG/1
1 TABLET, FILM COATED ORAL 2 TIMES DAILY
Qty: 20 TABLET | Refills: 0 | Status: SHIPPED | OUTPATIENT
Start: 2022-01-03 | End: 2022-10-21

## 2022-01-03 RX ADMIN — METHYLPREDNISOLONE SODIUM SUCCINATE 80 MG: 40 INJECTION, POWDER, LYOPHILIZED, FOR SOLUTION INTRAMUSCULAR; INTRAVENOUS at 11:53

## 2022-01-03 NOTE — PATIENT INSTRUCTIONS
Start Augmentin twice daily x10 days    If not better follow-up sooner than next regular appointment.

## 2022-01-03 NOTE — PROGRESS NOTES
Sadiq Moctezuma is a 51 y.o. female.     Vitals:    01/03/22 1106   BP: 110/80   Pulse: 74   Temp: 97.5 °F (36.4 °C)   SpO2: 98%        Chief Complaint   Patient presents with   • Cough     NO FEVER LARYNGITIS COUGH AND CONGESTED AT HOME COVID TEST NEGATIVE   • URI        History of Present Illness    Acute/same-day appointment for cough/URI    Complains of a 2 to 3-week history of lots of head congestion/sinus pressure.  She states this has been intermittent over the last several weeks; however, getting worse over the last 5 days.  Sinus headaches, bilateral ear pain, dry cough, and lots of sinus congestion/fullness.  Known history of allergies and asthma.  No increased use of her albuterol MDI.  Denies fever, SOA, wheezing.  No known Covid exposures.  Fully vaccinated, and history of Covid in March 2021.  She did do an at-home Covid rapid test, negative, done this morning.  Request this to be repeated here.    The following portions of the patient's history were reviewed and updated as appropriate: allergies, current medications, past family history, past medical history, past social history, past surgical history and problem list.    Review of Systems   Constitutional: Negative for fever, unexpected weight gain and unexpected weight loss.   HENT: Positive for congestion, ear pain, sore throat and voice change.    Respiratory: Positive for cough. Negative for shortness of breath.    Cardiovascular: Negative for chest pain.       Objective   Physical Exam  Vitals and nursing note reviewed.   Constitutional:       Appearance: Normal appearance. She is well-developed. She is obese.   HENT:      Head: Normocephalic and atraumatic.      Right Ear: Tympanic membrane, ear canal and external ear normal.      Left Ear: Tympanic membrane, ear canal and external ear normal.      Nose: Nose normal.      Mouth/Throat:      Mouth: Mucous membranes are moist.      Comments: Oropharynx with lots of postnasal drainage  only  Neck:      Thyroid: No thyromegaly.   Cardiovascular:      Rate and Rhythm: Normal rate and regular rhythm.      Heart sounds: Normal heart sounds.   Pulmonary:      Effort: Pulmonary effort is normal.      Breath sounds: Normal breath sounds.   Abdominal:      General: Abdomen is flat. Bowel sounds are normal. There is no distension.      Palpations: Abdomen is soft.      Tenderness: There is no abdominal tenderness.   Musculoskeletal:      Cervical back: Normal range of motion and neck supple.      Right lower leg: No edema.      Left lower leg: No edema.   Lymphadenopathy:      Cervical: No cervical adenopathy.   Neurological:      Mental Status: She is alert.          LABS/STUDIES    Procedures     Assessment/Plan   Diagnoses and all orders for this visit:    1. Acute non-recurrent frontal sinusitis (Primary)  -acute sinusitis  Start Augmentin 875 mg 1 p.o. twice daily x10 days  DM 80 today.  Continue singular, albuterol MDI as needed, and Allegra.  May start Tussionex as prescribed below as needed cough.  Will recheck Covid test today (home test negative this morning.)  If not better follow-up.  Otherwise keep regular scheduled appointment later this month  -     HYDROcod Polst-CPM Polst ER (Tussionex Pennkinetic ER) 10-8 MG/5ML ER suspension; Take 5 mL by mouth Every 12 (Twelve) Hours As Needed for Cough.  Dispense: 100 mL; Refill: 0    Other orders  -     amoxicillin-clavulanate (Augmentin) 875-125 MG per tablet; Take 1 tablet by mouth 2 (Two) Times a Day.  Dispense: 20 tablet; Refill: 0                 Wore goggles and face mask during entire visit.    Return if symptoms worsen or fail to improve, for Keep scheduled appointment as planned.

## 2022-01-04 LAB
LABCORP SARS-COV-2, NAA 2 DAY TAT: NORMAL
SARS-COV-2 RNA RESP QL NAA+PROBE: DETECTED

## 2022-01-17 ENCOUNTER — TELEPHONE (OUTPATIENT)
Dept: FAMILY MEDICINE CLINIC | Facility: CLINIC | Age: 52
End: 2022-01-17

## 2022-01-17 NOTE — TELEPHONE ENCOUNTER
Caller: Leann Moctezuma    Relationship to patient: Jose Elias    Best call back number: [phone]    Chief complaint: PATIENT WOULD LIKE TO SEE IF SHE CAN MOVE HER APPOINTMENT ON 1/19 @ 3:30 PM.  SHE WILL BE IN THE MIDDLE OF A COLONOSCOPY PREP FOR THE NEXT DAY?      Type of visit: OFFICE VISIT 3 MO F/U    Requested date: ANY AFTER THE 20 TH     If rescheduling, when is the original appointment: 1/19 @ 3:30 PM

## 2022-01-20 ENCOUNTER — OUTSIDE FACILITY SERVICE (OUTPATIENT)
Dept: GASTROENTEROLOGY | Facility: CLINIC | Age: 52
End: 2022-01-20

## 2022-01-20 PROCEDURE — 43239 EGD BIOPSY SINGLE/MULTIPLE: CPT | Performed by: INTERNAL MEDICINE

## 2022-01-20 PROCEDURE — 43450 DILATE ESOPHAGUS 1/MULT PASS: CPT | Performed by: INTERNAL MEDICINE

## 2022-01-20 PROCEDURE — 45385 COLONOSCOPY W/LESION REMOVAL: CPT | Performed by: INTERNAL MEDICINE

## 2022-01-20 RX ORDER — OMEPRAZOLE 40 MG/1
40 CAPSULE, DELAYED RELEASE ORAL 2 TIMES DAILY
Qty: 60 CAPSULE | Refills: 12 | Status: SHIPPED | OUTPATIENT
Start: 2022-01-20 | End: 2023-02-21 | Stop reason: SDUPTHER

## 2022-01-21 ENCOUNTER — OFFICE VISIT (OUTPATIENT)
Dept: FAMILY MEDICINE CLINIC | Facility: CLINIC | Age: 52
End: 2022-01-21

## 2022-01-21 VITALS
HEART RATE: 82 BPM | WEIGHT: 209.6 LBS | OXYGEN SATURATION: 98 % | BODY MASS INDEX: 34.92 KG/M2 | TEMPERATURE: 97.5 F | SYSTOLIC BLOOD PRESSURE: 126 MMHG | HEIGHT: 65 IN | DIASTOLIC BLOOD PRESSURE: 80 MMHG

## 2022-01-21 DIAGNOSIS — I10 ESSENTIAL HYPERTENSION: ICD-10-CM

## 2022-01-21 DIAGNOSIS — Z86.010 HISTORY OF COLON POLYPS: ICD-10-CM

## 2022-01-21 DIAGNOSIS — Z86.16 HISTORY OF COVID-19: ICD-10-CM

## 2022-01-21 DIAGNOSIS — K21.9 GASTROESOPHAGEAL REFLUX DISEASE WITHOUT ESOPHAGITIS: ICD-10-CM

## 2022-01-21 DIAGNOSIS — E03.8 OTHER SPECIFIED HYPOTHYROIDISM: ICD-10-CM

## 2022-01-21 DIAGNOSIS — E78.5 HYPERLIPIDEMIA, UNSPECIFIED HYPERLIPIDEMIA TYPE: Primary | ICD-10-CM

## 2022-01-21 DIAGNOSIS — K22.2 ESOPHAGEAL STRICTURE: ICD-10-CM

## 2022-01-21 PROBLEM — Z86.0100 HISTORY OF COLON POLYPS: Status: ACTIVE | Noted: 2022-01-21

## 2022-01-21 PROCEDURE — 99214 OFFICE O/P EST MOD 30 MIN: CPT | Performed by: FAMILY MEDICINE

## 2022-01-21 NOTE — PROGRESS NOTES
"Sadiq Moctezuma is a 51 y.o. female.     Vitals:    01/21/22 0805   BP: 126/80   Pulse: 82   Temp: 97.5 °F (36.4 °C)   SpO2: 98%        Chief Complaint   Patient presents with   • Hyperlipidemia     FOLLOW UP LABS   • Hypothyroidism   • Hypertension        History of Present Illness    3-month follow-up on hyperlipidemia, hypothyroidism, uncontrolled GERD, and recent COVID infection    LOV with me approximately 2 weeks ago for an acute URI/sinusitis and COVID.  She recovered uneventfully from her COVID infection.  She was fully vaccinated, not eligible for booster yet.    Otherwise, her last visit with me was in October for wellness exam, uncontrolled GERD, uncontrolled hyperlipidemia and hypothyroidism.  At that visit, several changes were made.  -- Synthroid was increased to 50 mcg every morning due to an elevated TSH, weight gain, and uncontrolled lipids.  -- No medications were added for uncontrolled hyperlipidemia () due to increasing Synthroid and wanting time to improve upon a healthier lifestyle.  -- She was referred to GI for an EGD due to uncontrolled GERD, also due for her C-scope.    Overall, doing much better.  Again, she recovered uneventfully from COVID.  She was compliant with all the above medication changes and referrals.  She did see GI, EGD was consistent with GERD only and esophageal stricture.  A \"moderate sized colon polyp\" was removed, awaiting pathology.  She is now back in weight watchers and starting her cardio exercise routine back up, has lost 6 pounds since last visit.    No complaints today.  Uncertain about needed refills today.  Just had 3-month fasting labs done approximately 1 week ago, here for review.  Compliant with and tolerates all medications without side effects.    The following portions of the patient's history were reviewed and updated as appropriate: allergies, current medications, past family history, past medical history, past social history, past " surgical history and problem list.    Review of Systems   Constitutional: Negative for fever, unexpected weight gain and unexpected weight loss.   Respiratory: Negative for cough and shortness of breath.    Cardiovascular: Negative for chest pain.       Objective   Physical Exam  Vitals and nursing note reviewed.   Constitutional:       Appearance: Normal appearance. She is well-developed. She is obese.   HENT:      Head: Normocephalic and atraumatic.      Nose: Nose normal.   Eyes:      Conjunctiva/sclera: Conjunctivae normal.      Pupils: Pupils are equal, round, and reactive to light.   Neck:      Thyroid: No thyromegaly.   Cardiovascular:      Rate and Rhythm: Normal rate and regular rhythm.      Heart sounds: Normal heart sounds. No murmur heard.      Pulmonary:      Effort: Pulmonary effort is normal.      Breath sounds: Normal breath sounds.   Abdominal:      General: Abdomen is flat. Bowel sounds are normal. There is no distension.      Palpations: Abdomen is soft. There is no hepatomegaly, splenomegaly or mass.      Tenderness: There is no abdominal tenderness. There is no guarding or rebound.      Hernia: No hernia is present.   Musculoskeletal:         General: Normal range of motion.      Cervical back: Normal range of motion and neck supple.      Right lower leg: No edema.      Left lower leg: No edema.   Lymphadenopathy:      Cervical: No cervical adenopathy.   Skin:     General: Skin is warm.   Neurological:      General: No focal deficit present.      Mental Status: She is alert.   Psychiatric:         Mood and Affect: Mood normal.         Behavior: Behavior normal.         Thought Content: Thought content normal.         Judgment: Judgment normal.          LABS/STUDIES  -1/13/2022 -- (previously 164 in October), HDL 39, normal TSH  October 2021 --CMP, CBC normal    Procedures     Assessment/Plan   Diagnoses and all orders for this visit:    1. Hyperlipidemia, unspecified hyperlipidemia type  (Primary)  -much better.  Controlled.  No medication needed.  Goal LDL needs to stay less than 130 given history of HTN.  Really does need to increase her exercise, slightly low HDL.  Needs low-carb/low calorie/low cholesterol diet and increase cardio exercise to greater than 150 minutes weekly    2. Essential hypertension  -controlled.  No change in medication.  Continue Benicar/HCT as prescribed, will refill upon request.  Electrolytes, renal function up-to-date and therapeutic.    3. Other specified hypothyroidism  -controlled.  Continue Synthroid 50 mcg every morning, will refill upon request.    4. History of COVID-19  -S/P infection 2 weeks ago, recovered  Fully vaccinated, not due for booster yet    5. Gastroesophageal reflux disease without esophagitis  -controlled.  Status post EGD recently, GERD and esophageal stricture only.  On a new PPI now    6. Esophageal stricture  -S/P dilation.  Clinically doing well    7. History of colon polyps  -pathology pending, instructed her to call GI doctor for results next week  Needs to know when to repeat again.                 Wore goggles and face mask during entire visit.    Return in about 9 months (around 10/21/2022) for Annual physical, Recheck.

## 2022-01-27 ENCOUNTER — TELEPHONE (OUTPATIENT)
Dept: GASTROENTEROLOGY | Facility: CLINIC | Age: 52
End: 2022-01-27

## 2022-02-10 RX ORDER — LEVOTHYROXINE SODIUM 0.05 MG/1
TABLET ORAL
Qty: 90 TABLET | Refills: 0 | Status: SHIPPED | OUTPATIENT
Start: 2022-02-10 | End: 2022-09-13

## 2022-04-08 ENCOUNTER — TELEPHONE (OUTPATIENT)
Dept: FAMILY MEDICINE CLINIC | Facility: CLINIC | Age: 52
End: 2022-04-08

## 2022-04-08 DIAGNOSIS — N63.10 MASS OF RIGHT BREAST, UNSPECIFIED QUADRANT: ICD-10-CM

## 2022-04-08 DIAGNOSIS — R92.8 ABNORMALITY OF RIGHT BREAST ON SCREENING MAMMOGRAM: Primary | ICD-10-CM

## 2022-04-08 NOTE — TELEPHONE ENCOUNTER
[facility] called because this patient is scheduled for a mammo and ultras sound on 4/12/22      They stated the orders were placed in December by a MA and then they were cancelled.     Does the patient still need to get these done and if so, can we place new orders     Please advise.

## 2022-04-12 ENCOUNTER — HOSPITAL ENCOUNTER (OUTPATIENT)
Dept: ULTRASOUND IMAGING | Facility: HOSPITAL | Age: 52
Discharge: HOME | End: 2022-04-12

## 2022-04-12 ENCOUNTER — HOSPITAL ENCOUNTER (OUTPATIENT)
Dept: MAMMOGRAPHY | Facility: HOSPITAL | Age: 52
Discharge: HOME | End: 2022-04-12

## 2022-04-12 DIAGNOSIS — R92.8 ABNORMALITY OF RIGHT BREAST ON SCREENING MAMMOGRAM: ICD-10-CM

## 2022-04-12 DIAGNOSIS — N63.10 MASS OF RIGHT BREAST, UNSPECIFIED QUADRANT: ICD-10-CM

## 2022-04-12 PROCEDURE — 76642 ULTRASOUND BREAST LIMITED: CPT

## 2022-04-12 PROCEDURE — G0279 TOMOSYNTHESIS, MAMMO: HCPCS

## 2022-04-12 PROCEDURE — 77065 DX MAMMO INCL CAD UNI: CPT

## 2022-09-13 RX ORDER — LEVOTHYROXINE SODIUM 0.05 MG/1
TABLET ORAL
Qty: 90 TABLET | Refills: 0 | Status: SHIPPED | OUTPATIENT
Start: 2022-09-13 | End: 2022-11-08 | Stop reason: DRUGHIGH

## 2022-09-15 ENCOUNTER — TELEPHONE (OUTPATIENT)
Dept: FAMILY MEDICINE CLINIC | Facility: CLINIC | Age: 52
End: 2022-09-15

## 2022-09-15 NOTE — TELEPHONE ENCOUNTER
Patient requesting call from clinical in regards to the denial of her requested Varenicline Tartrate. Please advise.

## 2022-09-16 RX ORDER — VARENICLINE TARTRATE 1 MG/1
1 TABLET, FILM COATED ORAL DAILY
Qty: 30 TABLET | Refills: 3 | Status: SHIPPED | OUTPATIENT
Start: 2022-09-16 | End: 2022-09-20 | Stop reason: SDUPTHER

## 2022-09-20 ENCOUNTER — TELEPHONE (OUTPATIENT)
Dept: FAMILY MEDICINE CLINIC | Facility: CLINIC | Age: 52
End: 2022-09-20

## 2022-09-20 RX ORDER — VARENICLINE TARTRATE 1 MG/1
1 TABLET, FILM COATED ORAL 2 TIMES DAILY
Qty: 60 TABLET | Refills: 3 | Status: SHIPPED | OUTPATIENT
Start: 2022-09-20

## 2022-09-20 NOTE — TELEPHONE ENCOUNTER
Pharmacy Name: [facility] - [place], [place] - [address] [facility] [phone]  - [phone]     What medication are you calling in regards to: varenicline (CHANTIX) 1 MG tablet    What question does the pharmacy have: IS WRITTEN FOR ONCE A DAY BUT NEEDS TO BE TWICE DAILY

## 2022-10-18 ENCOUNTER — TELEPHONE (OUTPATIENT)
Dept: FAMILY MEDICINE CLINIC | Facility: CLINIC | Age: 52
End: 2022-10-18

## 2022-10-18 NOTE — TELEPHONE ENCOUNTER
Caller: Leann Moctezuma    Relationship: Self    Best call back number: [phone]    What orders are you requesting (i.e. lab or imaging): LABS FOR PHYSICAL, WOULD ALSO LIKE TO GET HORMONES CHECKED    In what timeframe would the patient need to come in: THIS WEEK BEFORE HER PHYSICAL    Where will you receive your lab/imaging services: IN OFFICE    Additional notes: PLEASE ADVISE IF SHE CAN GET LABS DONE BEFORE HER PHYSICAL, CALL TO SCHEDULE ONCE ORDERS ARE IN.

## 2022-10-20 RX ORDER — MONTELUKAST SODIUM 10 MG/1
TABLET ORAL
Qty: 90 TABLET | Refills: 1 | Status: SHIPPED | OUTPATIENT
Start: 2022-10-20

## 2022-10-20 RX ORDER — OLMESARTAN MEDOXOMIL AND HYDROCHLOROTHIAZIDE 40/25 40; 25 MG/1; MG/1
TABLET ORAL
Qty: 90 TABLET | Refills: 1 | Status: SHIPPED | OUTPATIENT
Start: 2022-10-20

## 2022-10-21 ENCOUNTER — OFFICE VISIT (OUTPATIENT)
Dept: FAMILY MEDICINE CLINIC | Facility: CLINIC | Age: 52
End: 2022-10-21

## 2022-10-21 VITALS
BODY MASS INDEX: 32.92 KG/M2 | SYSTOLIC BLOOD PRESSURE: 110 MMHG | OXYGEN SATURATION: 98 % | HEART RATE: 80 BPM | TEMPERATURE: 97.5 F | DIASTOLIC BLOOD PRESSURE: 70 MMHG | HEIGHT: 65 IN | WEIGHT: 197.6 LBS

## 2022-10-21 DIAGNOSIS — J32.9 CHRONIC SINUSITIS, UNSPECIFIED LOCATION: ICD-10-CM

## 2022-10-21 DIAGNOSIS — I10 ESSENTIAL HYPERTENSION: ICD-10-CM

## 2022-10-21 DIAGNOSIS — Z87.891 PERSONAL HISTORY OF TOBACCO USE: ICD-10-CM

## 2022-10-21 DIAGNOSIS — Z12.31 ENCOUNTER FOR SCREENING MAMMOGRAM FOR BREAST CANCER: ICD-10-CM

## 2022-10-21 DIAGNOSIS — N95.1 HOT FLUSHES, PERIMENOPAUSAL: ICD-10-CM

## 2022-10-21 DIAGNOSIS — J32.9 CHRONIC SINUSITIS, UNSPECIFIED LOCATION: Primary | ICD-10-CM

## 2022-10-21 DIAGNOSIS — J01.00 ACUTE NON-RECURRENT MAXILLARY SINUSITIS: ICD-10-CM

## 2022-10-21 DIAGNOSIS — Z01.419 WELL WOMAN EXAM WITH ROUTINE GYNECOLOGICAL EXAM: Primary | ICD-10-CM

## 2022-10-21 DIAGNOSIS — E03.8 OTHER SPECIFIED HYPOTHYROIDISM: ICD-10-CM

## 2022-10-21 DIAGNOSIS — J45.20 MILD INTERMITTENT ASTHMA WITHOUT COMPLICATION: ICD-10-CM

## 2022-10-21 DIAGNOSIS — E78.5 HYPERLIPIDEMIA, UNSPECIFIED HYPERLIPIDEMIA TYPE: ICD-10-CM

## 2022-10-21 DIAGNOSIS — T78.40XS ALLERGY, SEQUELA: ICD-10-CM

## 2022-10-21 PROCEDURE — 99214 OFFICE O/P EST MOD 30 MIN: CPT | Performed by: FAMILY MEDICINE

## 2022-10-21 PROCEDURE — 99396 PREV VISIT EST AGE 40-64: CPT | Performed by: FAMILY MEDICINE

## 2022-10-21 RX ORDER — VENLAFAXINE HYDROCHLORIDE 37.5 MG/1
37.5 CAPSULE, EXTENDED RELEASE ORAL DAILY
Qty: 90 CAPSULE | Refills: 1 | Status: SHIPPED | OUTPATIENT
Start: 2022-10-21

## 2022-10-21 NOTE — PROGRESS NOTES
"Chief Complaint  Annual Exam (YEARLY WELLNESS S/P HYSTERECTOMY DUE FOR MAMMOGRAM HAD COLONOSCOPY LAST YEAR PATIENT IS FASTING), Hyperlipidemia, Hypothyroidism, Hypertension, and Menopause (WANTS TO CHECK HORMONE LEVELS)     Presents for yearly wellness exam/well woman  And  6-month follow-up for HTN, hyperlipidemia, hypothyroidism, and complaints of increasing hot flashes and chronic sinusitis    LOV with me in January for chronic med refills with labs.  No changes made at that visit, all labs stable.    Overall, continues to do well.  She had been doing a really good job with her weight loss, lost 25 pounds until recently when she quit smoking again for the third time last month.  She has since regained approximately 10 to 15 pounds in the last month but definitely relates this to the fact that she quit smoking and is off her \"[employer] program.\"  Has been going to \"[employer]\" way too much!!!  Sleeping fine besides hot flashes.  Mood good.    She does have a few complaints today.  -- Complains of increasing hot flashes.  Desires to have her hormone levels checked.  She is S/P RADHA due to vaginal bleeding/benign.  Still with ovaries.  She has been experiencing hot flashes for the last 1 to 2 years, although they are getting worse.  Already on black cohosh.    --Also, complains of \"chronic sinusitis.\"  Desires to see ENT again.  Status post sinus surgery approximately 20 years ago, did well into the last few years seems to be \"always having a sinus infection.\"  No acute infection at this time.  Already on antihistamine, Flonase, and Singulair.  Also previously treated with immunotherapy in the distant past, stopped her allergy shots due to cost and inconvenience.    Otherwise, just needs chronic med refills.  Due for fasting labs.  Compliant with and tolerates all medications without side effects.  Although she is not taking her Synthroid properly, at this time taking with all her other medications first thing in the " "morning.    Routine health maintenance/screening test:  PAP --- RADHA secondary to bleeding, benign  MAMMO --- December 2021  Colorectal Screen --- January 2022, benign polyps, repeat 3 years  Vaccines --- due for COVID-vaccine and influenza vaccine  Smoking/ETOH Status --- reformed tobacco, recently quit for the third time, 29 pack/day history.  Social alcohol only  Dentist, Eye Exam, Derm --- maintains regular dental visits, eye exam, and dermatologist  Diet/Exercise --- tries to maintain a healthy low-cholesterol diet and regular cardio exercise, however sporadic  Pertinent FH --- negative for colon cancer, premature CAD, breast cancer    Review of Systems   Constitutional: Negative for fever and unexpected weight change.   Respiratory: Negative for cough and shortness of breath.    Cardiovascular: Negative for chest pain.        Subjective          Leann Self presents to [facility]    Objective   Vital Signs:   Vitals:    10/21/22 0839   BP: 110/70   BP Location: Left arm   Patient Position: Sitting   Cuff Size: Adult   Pulse: 80   Temp: 97.5 °F (36.4 °C)   SpO2: 98%   Weight: 89.6 kg (197 lb 9.6 oz)   Height: 165.1 cm (65\")      Body mass index is 32.88 kg/m².   Physical Exam  Vitals and nursing note reviewed. Exam conducted with a chaperone present.   Constitutional:       Appearance: Normal appearance. She is well-developed.   HENT:      Head: Normocephalic and atraumatic.      Right Ear: External ear normal.      Left Ear: External ear normal.      Nose: Nose normal.   Eyes:      Conjunctiva/sclera: Conjunctivae normal.      Pupils: Pupils are equal, round, and reactive to light.   Neck:      Thyroid: No thyromegaly.      Vascular: No carotid bruit.   Cardiovascular:      Rate and Rhythm: Normal rate and regular rhythm.      Pulses: Normal pulses.      Heart sounds: Normal heart sounds. No murmur heard.  Pulmonary:      Effort: Pulmonary effort is normal.      Breath sounds: " Normal breath sounds.   Chest:   Breasts:     Right: Normal. No inverted nipple, mass, nipple discharge or tenderness.      Left: Normal. No inverted nipple, mass, nipple discharge or tenderness.   Abdominal:      General: Abdomen is flat. Bowel sounds are normal. There is no distension.      Palpations: Abdomen is soft. There is no hepatomegaly, splenomegaly or mass.      Tenderness: There is no abdominal tenderness. There is no guarding or rebound.      Hernia: No hernia is present. There is no hernia in the left inguinal area or right inguinal area.   Genitourinary:     General: Normal vulva.      Exam position: Lithotomy position.      Vagina: Normal.      Adnexa: Right adnexa normal and left adnexa normal.        Right: No mass, tenderness or fullness.          Left: No mass, tenderness or fullness.        Rectum: Normal. Guaiac result negative. No mass.      Comments: Status post RADHA, still with ovaries  Musculoskeletal:         General: Normal range of motion.      Cervical back: Normal range of motion and neck supple.      Right lower leg: No edema.      Left lower leg: No edema.   Lymphadenopathy:      Cervical: No cervical adenopathy.      Upper Body:      Right upper body: No supraclavicular or axillary adenopathy.      Left upper body: No supraclavicular or axillary adenopathy.      Lower Body: No right inguinal adenopathy. No left inguinal adenopathy.   Skin:     General: Skin is warm and dry.      Capillary Refill: Capillary refill takes less than 2 seconds.      Comments: No dysplastic nevi or abnormal lesions   Neurological:      General: No focal deficit present.      Mental Status: She is alert and oriented to person, place, and time.   Psychiatric:         Mood and Affect: Mood normal.         Behavior: Behavior normal.         Thought Content: Thought content normal.         Judgment: Judgment normal.        Result Review :     Common labs    Common Labs 1/12/22   Total Cholesterol 193    Triglycerides 142   HDL Cholesterol 39 (A)   LDL Cholesterol  128 (A)   (A) Abnormal value            TSH    TSH 1/12/22   TSH 1.630                 No results found for: ANADIRECT, FERRITIN, TESTOSTEROTT, [id number], FOLATE, RF, BNP            Assessment and Plan    Diagnoses and all orders for this visit:    1. Well woman exam with routine gynecological exam (Primary) --S/P breast exam/pelvic exam done, WNL  All screening up-to-date except for needs: Mammogram, labs listed below, low-dose CT chest/lung cancer screening, COVID booster and influenza vaccine (declines despite recommendations.)  See orders below.  Otherwise, continue to improve upon healthy lifestyle --Needs low-carb/low calorie/low cholesterol diet and increase cardio exercise to greater than 150 minutes weekly  -     CBC & Differential  -     Comprehensive Metabolic Panel  -     FSH & LH    2. Encounter for screening mammogram for breast cancer  -     Mammo Screening Bilateral With CAD; Future    3. Personal history of tobacco use  -      CT Chest Low Dose Cancer Screening WO; Future    4. Essential hypertension  -controlled  Plan to continue Benicar HCT 40/25 mg 1 p.o. daily  -     Comprehensive Metabolic Panel  -     Lipid Panel With LDL / HDL Ratio    5. Hyperlipidemia, unspecified hyperlipidemia type  -controlled  Recheck lipids, goal LDL ideally need to be less than 100, at least less than 130 given history of HTN to remain off medication.  Needs low-carb/low calorie/low cholesterol diet and increase cardio exercise to greater than 150 minutes weekly    6. Other specified hypothyroidism  -appears controlled  Recheck TSH  If therapeutic plan continue Synthroid 50 mcg 1 p.o. every morning, discussed proper dosing regimen again today  -     TSH    7. Hot flushes, perimenopausal  -uncontrolled  Given RADHA, check FSH.  Likely menopausal or perimenopausal given symptoms  May add Effexor XR 37.5 mg 1 p.o. daily, continue back cohosh  -     FSH &  LH    8. Chronic sinusitis, unspecified location  -uncontrolled  Recurrent sinusitis and AR exacerbations.  Status post sinus surgery in past.  Referral will be placed back to ENT    Other orders  -     venlafaxine XR (Effexor XR) 37.5 MG 24 hr capsule; Take 1 capsule by mouth Daily.  Dispense: 90 capsule; Refill: 1        Follow Up   Return in about 6 months (around 4/21/2023).  Patient was given instructions and counseling regarding her condition or for health maintenance advice. Please see specific information pulled into the AVS if appropriate.

## 2022-10-21 NOTE — PATIENT INSTRUCTIONS
May start Effexor 37.5 mg daily for hot flashes    Needs CT chest for lung cancer screening, mammogram    Referral to ENT for chronic sinusitis    Needs to get flu shot, COVID booster    Recommend low fat/low calorie diet and exercise greater than 150 minutes of cardio per week.

## 2022-10-22 LAB
ALBUMIN SERPL-MCNC: 4.7 G/DL (ref 3.5–5.2)
ALBUMIN/GLOB SERPL: 1.7 G/DL
ALP SERPL-CCNC: 81 U/L (ref 39–117)
ALT SERPL-CCNC: 61 U/L (ref 1–33)
AST SERPL-CCNC: 41 U/L (ref 1–32)
BASOPHILS # BLD AUTO: 0.07 10*3/MM3 (ref 0–0.2)
BASOPHILS NFR BLD AUTO: 0.7 % (ref 0–1.5)
BILIRUB SERPL-MCNC: 0.4 MG/DL (ref 0–1.2)
BUN SERPL-MCNC: 19 MG/DL (ref 6–20)
BUN/CREAT SERPL: 18.4 (ref 7–25)
CALCIUM SERPL-MCNC: 10.4 MG/DL (ref 8.6–10.5)
CHLORIDE SERPL-SCNC: 100 MMOL/L (ref 98–107)
CHOLEST SERPL-MCNC: 266 MG/DL (ref 0–200)
CO2 SERPL-SCNC: 27 MMOL/L (ref 22–29)
CREAT SERPL-MCNC: 1.03 MG/DL (ref 0.57–1)
EGFRCR SERPLBLD CKD-EPI 2021: 66.4 ML/MIN/1.73
EOSINOPHIL # BLD AUTO: 0.22 10*3/MM3 (ref 0–0.4)
EOSINOPHIL NFR BLD AUTO: 2.3 % (ref 0.3–6.2)
ERYTHROCYTE [DISTWIDTH] IN BLOOD BY AUTOMATED COUNT: 12.5 % (ref 12.3–15.4)
FSH SERPL-ACNC: 19.6 MIU/ML
GLOBULIN SER CALC-MCNC: 2.8 GM/DL
GLUCOSE SERPL-MCNC: 87 MG/DL (ref 65–99)
HCT VFR BLD AUTO: 45.6 % (ref 34–46.6)
HDLC SERPL-MCNC: 60 MG/DL (ref 40–60)
HGB BLD-MCNC: 14.9 G/DL (ref 12–15.9)
IMM GRANULOCYTES # BLD AUTO: 0.01 10*3/MM3 (ref 0–0.05)
IMM GRANULOCYTES NFR BLD AUTO: 0.1 % (ref 0–0.5)
LDLC SERPL CALC-MCNC: 183 MG/DL (ref 0–100)
LDLC/HDLC SERPL: 3 {RATIO}
LH SERPL-ACNC: 15.1 MIU/ML
LYMPHOCYTES # BLD AUTO: 3.53 10*3/MM3 (ref 0.7–3.1)
LYMPHOCYTES NFR BLD AUTO: 36.5 % (ref 19.6–45.3)
MCH RBC QN AUTO: 29 PG (ref 26.6–33)
MCHC RBC AUTO-ENTMCNC: 32.7 G/DL (ref 31.5–35.7)
MCV RBC AUTO: 88.7 FL (ref 79–97)
MONOCYTES # BLD AUTO: 0.59 10*3/MM3 (ref 0.1–0.9)
MONOCYTES NFR BLD AUTO: 6.1 % (ref 5–12)
NEUTROPHILS # BLD AUTO: 5.25 10*3/MM3 (ref 1.7–7)
NEUTROPHILS NFR BLD AUTO: 54.3 % (ref 42.7–76)
NRBC BLD AUTO-RTO: 0 /100 WBC (ref 0–0.2)
PLATELET # BLD AUTO: 336 10*3/MM3 (ref 140–450)
POTASSIUM SERPL-SCNC: 4.3 MMOL/L (ref 3.5–5.2)
PROT SERPL-MCNC: 7.5 G/DL (ref 6–8.5)
RBC # BLD AUTO: 5.14 10*6/MM3 (ref 3.77–5.28)
SODIUM SERPL-SCNC: 139 MMOL/L (ref 136–145)
TRIGL SERPL-MCNC: 129 MG/DL (ref 0–150)
TSH SERPL DL<=0.005 MIU/L-ACNC: 3.26 UIU/ML (ref 0.27–4.2)
VLDLC SERPL CALC-MCNC: 23 MG/DL (ref 5–40)
WBC # BLD AUTO: 9.67 10*3/MM3 (ref 3.4–10.8)

## 2022-11-08 RX ORDER — LEVOTHYROXINE SODIUM 0.07 MG/1
75 TABLET ORAL DAILY
Qty: 90 TABLET | Refills: 1 | Status: SHIPPED | OUTPATIENT
Start: 2022-11-08

## 2023-02-07 ENCOUNTER — OFFICE VISIT (OUTPATIENT)
Dept: FAMILY MEDICINE CLINIC | Facility: CLINIC | Age: 53
End: 2023-02-07
Payer: COMMERCIAL

## 2023-02-07 VITALS
TEMPERATURE: 97.8 F | BODY MASS INDEX: 32.86 KG/M2 | HEART RATE: 90 BPM | HEIGHT: 65 IN | DIASTOLIC BLOOD PRESSURE: 70 MMHG | SYSTOLIC BLOOD PRESSURE: 100 MMHG | OXYGEN SATURATION: 98 % | WEIGHT: 197.2 LBS

## 2023-02-07 DIAGNOSIS — R05.9 COUGH, UNSPECIFIED TYPE: ICD-10-CM

## 2023-02-07 DIAGNOSIS — J01.01 ACUTE RECURRENT MAXILLARY SINUSITIS: Primary | ICD-10-CM

## 2023-02-07 DIAGNOSIS — J45.20 MILD INTERMITTENT ASTHMA WITHOUT COMPLICATION: ICD-10-CM

## 2023-02-07 PROCEDURE — 99214 OFFICE O/P EST MOD 30 MIN: CPT | Performed by: FAMILY MEDICINE

## 2023-02-07 PROCEDURE — 96372 THER/PROPH/DIAG INJ SC/IM: CPT | Performed by: FAMILY MEDICINE

## 2023-02-07 RX ORDER — LEVOFLOXACIN 750 MG/1
750 TABLET ORAL DAILY
Qty: 14 TABLET | Refills: 0 | Status: SHIPPED | OUTPATIENT
Start: 2023-02-07

## 2023-02-07 RX ORDER — METHYLPREDNISOLONE SODIUM SUCCINATE 40 MG/ML
80 INJECTION, POWDER, LYOPHILIZED, FOR SOLUTION INTRAMUSCULAR; INTRAVENOUS ONCE
Status: COMPLETED | OUTPATIENT
Start: 2023-02-07 | End: 2023-02-07

## 2023-02-07 RX ADMIN — METHYLPREDNISOLONE SODIUM SUCCINATE 80 MG: 40 INJECTION, POWDER, LYOPHILIZED, FOR SOLUTION INTRAMUSCULAR; INTRAVENOUS at 11:59

## 2023-02-07 NOTE — PATIENT INSTRUCTIONS
Start Levaquin 750 mg 1 p.o. daily x2 weeks    Solu-Medrol IM given, restart prednisone x5 days    Needs to follow-up with ENT specialist/Dr. Esquivel    If not better follow-up sooner than next regular appointment.

## 2023-02-07 NOTE — PROGRESS NOTES
Chief Complaint  Sinusitis (Congestion cant breathe stays stopped up saw ENT did not finish meds ENT gace due to side effects just cant get rid of this ongoing since October ), Facial Pain (Teeth hurt), Cough, and Fatigue (Due to illness )     Acute/work in appointment made for sinus congestion, pressure, cough    Complains of recurrent sinus congestion, pressure, and cough again x2 weeks.  History of chronic sinusitis, recurrent infections multiple times within the last few years despite conservative treatment.  Each acute sinusitis episode does respond to antibiotics and prednisone; however, recur shortly after antibiotic treatment.  At last visit with me in October for her wellness exam and general med checkup, she was referred back to her ENT specialist/Dr. Esquivel for reevaluation.  Status post sinus surgery in the past, greater than 20 years ago.    She did see ENT specialist/Dr. Esquivel about 2 weeks ago for evaluation (records unavailable, independent provider.)  However, he was unable to do any type of endoscopic evaluation due to significant/acute sinusitis.  Recommendations were for a CT sinus scan however she declined due to cost and deductible.  Planning on a nasopharyngeal scope once her acute infection resolves.  He did start her on Bactrim and prednisone but she only took the medications x5 days due to side effects/nausea.  Also, she did take a prescription for Augmentin x8 days back in November 2022 for another sinusitis (this was left over prescription from a previous infection.)    Currently, she continues with bad sinus congestion, pressure, headache, teeth hurt etc. now x2 to 3 weeks.  Several home COVID test all negative.  Lots of bloody colored nasal drainage, lots of phlegm, and lots of deep coughing especially nightly.  She is needing to use her albuterol MDI more often, normally only uses on an as-needed basis and currently is needing it 2-3 times per day.  Unfortunately, continues to  "smoke.  No fever, SOA, wheezing.    Review of Systems   Constitutional: Positive for fatigue. Negative for fever and unexpected weight change.   HENT: Positive for congestion, ear pain, sinus pressure and sinus pain.    Respiratory: Positive for cough. Negative for shortness of breath.    Cardiovascular: Negative for chest pain.        Sadiq Moctezuma presents to [facility]    Objective   Vital Signs:   Vitals:    02/07/23 1102   BP: 100/70   BP Location: Left arm   Patient Position: Sitting   Cuff Size: Adult   Pulse: 90   Temp: 97.8 °F (36.6 °C)   SpO2: 98%   Weight: 89.4 kg (197 lb 3.2 oz)   Height: 165.1 cm (65\")      Body mass index is 32.82 kg/m².   Physical Exam  Vitals and nursing note reviewed.   Constitutional:       Appearance: Normal appearance. She is well-developed.   HENT:      Head: Normocephalic and atraumatic.      Comments: Lots of sinus congestion, pressure, tenderness maxillary sinuses  Oropharynx --extensive gray cobblestoning appearance     Right Ear: Tympanic membrane normal.      Left Ear: Tympanic membrane normal.      Nose: Congestion and rhinorrhea present.   Eyes:      Conjunctiva/sclera: Conjunctivae normal.      Pupils: Pupils are equal, round, and reactive to light.   Neck:      Thyroid: No thyromegaly.   Cardiovascular:      Rate and Rhythm: Normal rate and regular rhythm.      Heart sounds: Normal heart sounds. No murmur heard.  Pulmonary:      Effort: No respiratory distress.      Breath sounds: Normal breath sounds. No wheezing, rhonchi or rales.   Abdominal:      General: Abdomen is flat. Bowel sounds are normal. There is no distension.      Palpations: Abdomen is soft. There is no hepatomegaly, splenomegaly or mass.      Tenderness: There is no abdominal tenderness. There is no guarding or rebound.      Hernia: No hernia is present.   Musculoskeletal:         General: Normal range of motion.      Cervical back: Normal range of " motion and neck supple.      Right lower leg: No edema.      Left lower leg: No edema.   Lymphadenopathy:      Cervical: No cervical adenopathy.   Skin:     General: Skin is warm.   Neurological:      General: No focal deficit present.      Mental Status: She is alert.   Psychiatric:         Mood and Affect: Mood normal.         Behavior: Behavior normal.         Thought Content: Thought content normal.         Judgment: Judgment normal.        Result Review :     Common labs    Common Labs 10/21/22 10/21/22 10/21/22    0924 0924 0924   Glucose  87    BUN  19    Creatinine  1.03 (A)    Sodium  139    Potassium  4.3    Chloride  100    Calcium  10.4    Total Protein  7.5    Albumin  4.70    Total Bilirubin  0.4    Alkaline Phosphatase  81    AST (SGOT)  41 (A)    ALT (SGPT)  61 (A)    WBC 9.67     Hemoglobin 14.9     Hematocrit 45.6     Platelets 336     Total Cholesterol   266 (A)   Triglycerides   129   HDL Cholesterol   60   LDL Cholesterol    183 (A)   (A) Abnormal value       Comments are available for some flowsheets but are not being displayed.           TSH    TSH 10/21/22   TSH 3.260             No results found for: ANADIRECT, FERRITIN, TESTOSTEROTT, [id number], FOLATE, RF, BNP            Assessment and Plan    Diagnoses and all orders for this visit:    1. Acute recurrent maxillary sinusitis (Primary)  -acute on chronic sinusitis, uncontrolled  For acute sinusitis, start Levaquin 750 mg 1 p.o. daily x14 days.  Also a Solu-Medrol 80 mg IM given today.  And, recommend restarting the prednisone prescription that she has at home x3 to 5 days.  For the chronic sinusitis/recurrent --- really needs to follow back up with ENT specialist/Dr. Jasson Esquivel for reevaluation.  Planning endoscopy and/or repeat CT sinus   -     levoFLOXacin (Levaquin) 750 MG tablet; Take 1 tablet by mouth Daily.  Dispense: 14 tablet; Refill: 0  -     HYDROcod Polst-CPM Polst ER (TUSSIONEX PENNKINETIC) 10-8 MG/5ML ER suspension; Take 5 mL  by mouth Every 12 (Twelve) Hours As Needed for Cough.  Dispense: 100 mL; Refill: 0  -     methylPREDNISolone sodium succinate (SOLU-Medrol) injection 80 mg    2. Mild intermittent asthma without complication  -mild exacerbation  Solu-Medrol 80 mg given today, IM  Restart prednisone prescription that was given 2 weeks ago from ENT specialist, recommend taking x3 to 5 days at least.  May continue her albuterol MDI as needed.  Really needs to quit smoking!  May start Tussionex cough syrup as directed, primarily nightly  -     HYDROcod Polst-CPM Polst ER (TUSSIONEX PENNKINETIC) 10-8 MG/5ML ER suspension; Take 5 mL by mouth Every 12 (Twelve) Hours As Needed for Cough.  Dispense: 100 mL; Refill: 0  -     methylPREDNISolone sodium succinate (SOLU-Medrol) injection 80 mg    3. Cough, unspecified type  -     HYDROcod Polst-CPM Polst ER (TUSSIONEX PENNKINETIC) 10-8 MG/5ML ER suspension; Take 5 mL by mouth Every 12 (Twelve) Hours As Needed for Cough.  Dispense: 100 mL; Refill: 0  -     methylPREDNISolone sodium succinate (SOLU-Medrol) injection 80 mg    Follow-up with ENT specialist.  Needs to make appointment for recheck on lipids, thyroid, etc. overdue for regular checkup visit with labs.        Follow Up   Return in about 3 months (around 5/7/2023) for Recheck.  Patient was given instructions and counseling regarding her condition or for health maintenance advice. Please see specific information pulled into the AVS if appropriate.

## 2023-02-14 ENCOUNTER — HOSPITAL ENCOUNTER (OUTPATIENT)
Dept: MAMMOGRAPHY | Facility: HOSPITAL | Age: 53
Discharge: HOME | End: 2023-02-14
Admitting: FAMILY MEDICINE
Payer: COMMERCIAL

## 2023-02-14 DIAGNOSIS — Z12.31 ENCOUNTER FOR SCREENING MAMMOGRAM FOR BREAST CANCER: ICD-10-CM

## 2023-02-14 PROCEDURE — 77063 BREAST TOMOSYNTHESIS BI: CPT

## 2023-02-14 PROCEDURE — 77067 SCR MAMMO BI INCL CAD: CPT

## 2023-02-21 RX ORDER — OMEPRAZOLE 40 MG/1
CAPSULE, DELAYED RELEASE ORAL
Qty: 60 CAPSULE | Refills: 12 | Status: SHIPPED | OUTPATIENT
Start: 2023-02-21

## 2023-02-22 ENCOUNTER — TELEPHONE (OUTPATIENT)
Dept: FAMILY MEDICINE CLINIC | Facility: CLINIC | Age: 53
End: 2023-02-22
Payer: COMMERCIAL

## 2023-02-22 RX ORDER — FLUCONAZOLE 150 MG/1
TABLET ORAL
Qty: 2 TABLET | Refills: 0 | Status: SHIPPED | OUTPATIENT
Start: 2023-02-22

## 2023-02-22 RX ORDER — METHYLPREDNISOLONE 4 MG/1
TABLET ORAL
Qty: 21 TABLET | Refills: 0 | Status: SHIPPED | OUTPATIENT
Start: 2023-02-22

## 2023-02-22 NOTE — TELEPHONE ENCOUNTER
Caller: Leann Moctezuma    Relationship: Self    Best call back number: [phone]    What medication are you requesting: POSSIBLE YEAST INFECTION/CONGESTION MEDICATION    What are your current symptoms: ITCHING, DISCHARGE, AND BURNING/CONGESTION (WEEK 6), CLOGGED EARS IN INNER EARS, HARD TO HEAR    How long have you been experiencing symptoms: ABOUT 6 WEEKS    Have you had these symptoms before:    [x] Yes  [] No    Have you been treated for these symptoms before:   [x] Yes  [] No    If a prescription is needed, what is your preferred pharmacy and phone number: [place], [place] - [address] [facility] - [phone]  - [phone]     Additional notes: PATIENT STATES SHE HAS ALREADY BEEN ON 2 ROUNDS OF ANTIBIOTICS WITH THE CONGESTION AND CLOGGED EARS, AND FEELS LIKE THE MEDICATION HAS CAUSED HER TO HAVE A YEAST INFECTION. SHE IS REQUESTING MEDICATION TO RELIEVE BOTH SETS OF SYMPTOMS.     PLEASE ADVISE.

## 2023-02-24 ENCOUNTER — TRANSCRIBE ORDERS (OUTPATIENT)
Dept: ADMINISTRATIVE | Facility: HOSPITAL | Age: 53
End: 2023-02-24
Payer: COMMERCIAL

## 2023-02-24 DIAGNOSIS — J32.4 CHRONIC PANSINUSITIS: Primary | ICD-10-CM

## 2023-03-10 ENCOUNTER — HOSPITAL ENCOUNTER (OUTPATIENT)
Dept: CT IMAGING | Facility: HOSPITAL | Age: 53
Discharge: HOME | End: 2023-03-10
Admitting: FAMILY MEDICINE
Payer: COMMERCIAL

## 2023-03-10 DIAGNOSIS — Z87.891 PERSONAL HISTORY OF TOBACCO USE: ICD-10-CM

## 2023-03-10 PROCEDURE — 71271 CT THORAX LUNG CANCER SCR C-: CPT

## 2023-03-16 ENCOUNTER — HOSPITAL ENCOUNTER (OUTPATIENT)
Dept: CT IMAGING | Facility: HOSPITAL | Age: 53
Discharge: HOME | End: 2023-03-16
Admitting: OTOLARYNGOLOGY
Payer: COMMERCIAL

## 2023-03-16 DIAGNOSIS — J32.4 CHRONIC PANSINUSITIS: ICD-10-CM

## 2023-03-16 PROCEDURE — 70486 CT MAXILLOFACIAL W/O DYE: CPT

## 2023-05-22 ENCOUNTER — TELEMEDICINE (OUTPATIENT)
Dept: FAMILY MEDICINE CLINIC | Facility: CLINIC | Age: 53
End: 2023-05-22
Payer: COMMERCIAL

## 2023-05-22 DIAGNOSIS — J30.1 ALLERGIC RHINITIS DUE TO POLLEN, UNSPECIFIED SEASONALITY: Primary | ICD-10-CM

## 2023-05-22 PROCEDURE — 99213 OFFICE O/P EST LOW 20 MIN: CPT | Performed by: STUDENT IN AN ORGANIZED HEALTH CARE EDUCATION/TRAINING PROGRAM

## 2023-05-22 RX ORDER — IPRATROPIUM BROMIDE 42 UG/1
2 SPRAY, METERED NASAL 4 TIMES DAILY
Qty: 15 ML | Refills: 0 | Status: SHIPPED | OUTPATIENT
Start: 2023-05-22

## 2023-05-22 RX ORDER — AZELASTINE 1 MG/ML
2 SPRAY, METERED NASAL 2 TIMES DAILY
Qty: 30 ML | Refills: 0 | Status: SHIPPED | OUTPATIENT
Start: 2023-05-22 | End: 2023-06-21

## 2023-05-22 NOTE — PROGRESS NOTES
"Chief Complaint  Illness (Congestion and sinus in chest. Productive cough, no fever, no body aches or chills/-pt tested for covid 2 days ago/-symptoms ongoing since Friday )    Subjective        Leann CLARK Self presents to [facility]  History of Present Illness  52-year-old female with asthma and chronic sinusitis who presents for upper respiratory symptoms for 3 days.    She states that on Friday she began noticing increased head congestion.  No shortness of breath.  No fevers or sick contacts.  She did some yard work last week which she feels like exacerbated symptoms.  She tested for COVID on Saturday and was negative.    She has a long history of chronic sinusitis for which she has seen ENT.  She uses a gentamicin/budesonide nasal rinse twice daily.  She also uses Allegra-D and is on Singulair daily.  She has been compliant with this treatment plan.      Objective   Vital Signs:  There were no vitals taken for this visit.  Estimated body mass index is 32.82 kg/m² as calculated from the following:    Height as of 2/7/23: 165.1 cm (65\").    Weight as of 2/7/23: 89.4 kg (197 lb 3.2 oz).         Physical Exam  Constitutional:       General: She is not in acute distress.  Eyes:      Conjunctiva/sclera: Conjunctivae normal.   Pulmonary:      Effort: Pulmonary effort is normal. No respiratory distress.      Breath sounds: Normal breath sounds.   Neurological:      Mental Status: She is alert and oriented to person, place, and time.   Psychiatric:         Mood and Affect: Mood normal.         Behavior: Behavior normal.        Result Review :  The following data was reviewed by: Jasmina Duffy MD on 05/22/2023:  Common labs        10/21/2022    09:24   Common Labs   Glucose 87     BUN 19     Creatinine 1.03     Sodium 139     Potassium 4.3     Chloride 100     Calcium 10.4     Total Protein 7.5     Albumin 4.70     Total Bilirubin 0.4     Alkaline Phosphatase 81     AST (SGOT) 41     ALT " (SGPT) 61     WBC 9.67     Hemoglobin 14.9     Hematocrit 45.6     Platelets 336     Total Cholesterol 266     Triglycerides 129     HDL Cholesterol 60     LDL Cholesterol  183       Data reviewed: None             Assessment and Plan   Diagnoses and all orders for this visit:    1. Allergic rhinitis due to pollen, unspecified seasonality (Primary)  -     ipratropium (ATROVENT) 0.06 % nasal spray; 2 sprays into the nostril(s) as directed by provider 4 (Four) Times a Day.  Dispense: 15 mL; Refill: 0  -     azelastine (ASTELIN) 0.1 % nasal spray; 2 sprays into the nostril(s) as directed by provider 2 (Two) Times a Day for 30 days. Spray in each nostril 2 times daily as directed  Dispense: 30 mL; Refill: 0    Symptom onset: 3 days ago  Symptoms include rhinorrhea, congestion, cough. No fevers.  No sick contacts.  Etiology likely allergic.  She was COVID-negative at home.    Counseled patient to use Mucinex DM for congestion and cough.  She follows with ENT and is on a good allergy regimen including budesonide and gentamicin nasal rinse, Allegra-D, Singulair.  We will add azelastine nasal spray to be used daily as well as ipratropium nasal spray to be used when symptoms are worse.      If symptoms do not improve after 7-10 days she should call and antibiotic would be warranted at that time.  If she were to develop severe shortness of breath, go to the closest emergency department.    Patient presents during the COVID-19 pandemic/federally declared state of public health emergency.   This service was conducted via [employer] Video Call. Patient had recent COVID19 exposure. Consent obtained for telehealth services.     Provider location: [facility]  Patient location: home/car       Follow Up   No follow-ups on file.  Patient was given instructions and counseling regarding her condition or for health maintenance advice. Please see specific information pulled into the AVS if appropriate.

## 2023-05-24 RX ORDER — ALBUTEROL SULFATE 90 UG/1
AEROSOL, METERED RESPIRATORY (INHALATION)
Qty: 8.5 G | Refills: 1 | Status: SHIPPED | OUTPATIENT
Start: 2023-05-24

## 2023-06-19 ENCOUNTER — OFFICE VISIT (OUTPATIENT)
Dept: FAMILY MEDICINE CLINIC | Facility: CLINIC | Age: 53
End: 2023-06-19
Payer: COMMERCIAL

## 2023-06-19 VITALS
BODY MASS INDEX: 36.22 KG/M2 | WEIGHT: 217.4 LBS | HEART RATE: 74 BPM | TEMPERATURE: 98 F | SYSTOLIC BLOOD PRESSURE: 100 MMHG | HEIGHT: 65 IN | DIASTOLIC BLOOD PRESSURE: 70 MMHG | OXYGEN SATURATION: 98 %

## 2023-06-19 DIAGNOSIS — B88.0 CHIGGER BITES: Primary | ICD-10-CM

## 2023-06-19 RX ORDER — METHYLPREDNISOLONE SODIUM SUCCINATE 40 MG/ML
80 INJECTION, POWDER, LYOPHILIZED, FOR SOLUTION INTRAMUSCULAR; INTRAVENOUS ONCE
Status: COMPLETED | OUTPATIENT
Start: 2023-06-19 | End: 2023-06-19

## 2023-06-19 RX ORDER — CEPHALEXIN 500 MG/1
500 CAPSULE ORAL 2 TIMES DAILY
Qty: 14 CAPSULE | Refills: 0 | Status: SHIPPED | OUTPATIENT
Start: 2023-06-19

## 2023-06-19 RX ADMIN — METHYLPREDNISOLONE SODIUM SUCCINATE 80 MG: 40 INJECTION, POWDER, LYOPHILIZED, FOR SOLUTION INTRAMUSCULAR; INTRAVENOUS at 10:00

## 2023-06-19 NOTE — PATIENT INSTRUCTIONS
Start Keflex 500 mg twice daily x1 week    If not better may need to add p.o. steroids, may call back    If not better follow-up sooner than next regular appointment.

## 2023-06-19 NOTE — PROGRESS NOTES
"Chief Complaint  Insect Bite (POSSIBLE CHIGGER BITES THAT HAVE GOTTEN INFECTED)    Same day/work in appointment for chigger bites    Complains of chigger bites in multiple areas x 1 week.  Most of the areas have resolved, however still with chigger bites on her right and left buttocks.  These have not improved, getting worse.  Thinks they are infected?  Very itchy.  Has been using over-the-counter/bacitracin with little relief.  No fever.  History of allergies.    Review of Systems   Constitutional:  Negative for fever and unexpected weight change.   Respiratory:  Negative for cough and shortness of breath.    Cardiovascular:  Negative for chest pain.      Subjective          Leann CLARK Self presents to [facility]    Objective   Vital Signs:   Vitals:    06/19/23 0914   BP: 100/70   BP Location: Left arm   Patient Position: Sitting   Cuff Size: Adult   Pulse: 74   Temp: 98 °F (36.7 °C)   SpO2: 98%   Weight: 98.6 kg (217 lb 6.4 oz)   Height: 165.1 cm (65\")      Body mass index is 36.18 kg/m².   Physical Exam  HENT:      Head: Normocephalic.   Genitourinary:     Comments: Bilateral buttocks, right greater than left --- with several erythematous raised pruritic chigger bites, some mild warmth and spreading erythema, no purulence, no induration  Neurological:      Mental Status: She is alert.      Result Review :         No results found for: ANADIRECT, FERRITIN, TESTOSTEROTT, [id number], FOLATE, RF, BNP            Assessment and Plan    Diagnoses and all orders for this visit:    1. Chigger bites (Primary) -new diagnosis, uncontrolled with mild cellulitis  Solu-Medrol 80 mg injection, may need to add low-dose prednisone if not better  Also start Keflex 500 mg 1 p.o. twice daily x1 week for some mild cellulitis.  Natural history and course discussed.    If not better follow-up.  Otherwise keep regular appointment as planned in the fall for wellness  -     methylPREDNISolone sodium succinate " (SOLU-Medrol) injection 80 mg    Other orders  -     cephalexin (Keflex) 500 MG capsule; Take 1 capsule by mouth 2 (Two) Times a Day.  Dispense: 14 capsule; Refill: 0        Follow Up   Return if symptoms worsen or fail to improve, keep regular appointment.  Patient was given instructions and counseling regarding her condition or for health maintenance advice. Please see specific information pulled into the AVS if appropriate.

## 2023-10-17 ENCOUNTER — TELEPHONE (OUTPATIENT)
Dept: FAMILY MEDICINE CLINIC | Facility: CLINIC | Age: 53
End: 2023-10-17

## 2023-10-17 DIAGNOSIS — E03.8 OTHER SPECIFIED HYPOTHYROIDISM: ICD-10-CM

## 2023-10-17 DIAGNOSIS — K21.9 GASTROESOPHAGEAL REFLUX DISEASE WITHOUT ESOPHAGITIS: ICD-10-CM

## 2023-10-17 DIAGNOSIS — E78.5 HYPERLIPIDEMIA, UNSPECIFIED HYPERLIPIDEMIA TYPE: ICD-10-CM

## 2023-10-17 DIAGNOSIS — I10 ESSENTIAL HYPERTENSION: Primary | ICD-10-CM

## 2023-10-17 NOTE — TELEPHONE ENCOUNTER
Caller: Leann Moctezuma    Relationship: Self    Best call back number: [phone]     What orders are you requesting (i.e. lab or imaging): LABS    In what timeframe would the patient need to come in:      Where will you receive your lab/imaging services:  OFFICE    Additional notes: PATIENT CALLED AND NEED LABS ORDERED AND THEN SCHEDULED PRIOR TO THE PHYSICAL APPOINTMENT ON 10/27/23.  PLEASE CALL HER BACK TO SCHEDULE LABS ONCE ORDERED.

## 2023-10-23 RX ORDER — MONTELUKAST SODIUM 10 MG/1
TABLET ORAL
Qty: 90 TABLET | Refills: 1 | Status: SHIPPED | OUTPATIENT
Start: 2023-10-23

## 2023-10-23 RX ORDER — OLMESARTAN MEDOXOMIL AND HYDROCHLOROTHIAZIDE 40/25 40; 25 MG/1; MG/1
1 TABLET ORAL DAILY
Qty: 90 TABLET | Refills: 0 | Status: SHIPPED | OUTPATIENT
Start: 2023-10-23

## 2023-10-27 ENCOUNTER — OFFICE VISIT (OUTPATIENT)
Dept: FAMILY MEDICINE CLINIC | Facility: CLINIC | Age: 53
End: 2023-10-27
Payer: COMMERCIAL

## 2023-10-27 VITALS
WEIGHT: 199.6 LBS | SYSTOLIC BLOOD PRESSURE: 90 MMHG | BODY MASS INDEX: 33.26 KG/M2 | OXYGEN SATURATION: 99 % | DIASTOLIC BLOOD PRESSURE: 60 MMHG | TEMPERATURE: 97.5 F | HEART RATE: 82 BPM | HEIGHT: 65 IN

## 2023-10-27 DIAGNOSIS — E83.52 HYPERCALCEMIA: ICD-10-CM

## 2023-10-27 DIAGNOSIS — Z00.00 WELLNESS EXAMINATION: Primary | ICD-10-CM

## 2023-10-27 DIAGNOSIS — E03.8 OTHER SPECIFIED HYPOTHYROIDISM: ICD-10-CM

## 2023-10-27 DIAGNOSIS — I10 ESSENTIAL HYPERTENSION: ICD-10-CM

## 2023-10-27 DIAGNOSIS — J45.20 MILD INTERMITTENT ASTHMA WITHOUT COMPLICATION: ICD-10-CM

## 2023-10-27 DIAGNOSIS — N17.9 AKI (ACUTE KIDNEY INJURY): ICD-10-CM

## 2023-10-27 DIAGNOSIS — Z23 FLU VACCINE NEED: ICD-10-CM

## 2023-10-27 DIAGNOSIS — Z87.891 PERSONAL HISTORY OF TOBACCO USE: ICD-10-CM

## 2023-10-27 DIAGNOSIS — E78.01 FAMILIAL HYPERCHOLESTEROLEMIA: ICD-10-CM

## 2023-10-27 DIAGNOSIS — R53.82 CHRONIC FATIGUE: ICD-10-CM

## 2023-10-27 RX ORDER — ONDANSETRON 8 MG/1
8 TABLET, ORALLY DISINTEGRATING ORAL EVERY 8 HOURS PRN
COMMUNITY

## 2023-10-27 NOTE — PATIENT INSTRUCTIONS
Recommend stopping Wegovy due to kidneys    May remain off blood pressure medication, will reevaluate in 2 to 3 months.    Recheck labs in 3 months prior to follow-up.    Needs cardiac calcium score testing to determine if need cholesterol treatment    Further recommendations to follow based on labs

## 2023-10-27 NOTE — PROGRESS NOTES
"Chief Complaint  Annual Exam (Yearly wellness follow up labs Mammogram done 2/2023 Colonoscopy done 1/2022 S/P hysterectomy patient states she gets pelvic exam every year), Hypothyroidism (Has not taken meds for 3 months), and Hypertension (D/C'd blood pressure meds due to low reading and lightheaded)    NEEDS ANNUAL WELLNESS  And  1 year follow-up on hypothyroidism, HTN, chronic sinusitis, asthma/allergies, hyperlipidemia, and complaints of lightheadedness, fatigue    Seen 2 times within the last year for acute care visits only.  Most recently for chigger bites, treated conservatively.  Seen about 6 months ago for recurrent sinusitis, referred to ENT at that time.  She did see Dr. Jasson Esquivel, CT sinus scan ordered and nasopharyngoscopy done.  CT sinus showed opacification yet nasopharyngoscopy WNL.  Recommendations were to start immunotherapy, however declined due to time and money.  Since she has started a new compounding wash as prescribed by ENT specialist which seems to be helping.  No recent allergy, asthma, or sinus infections.    Otherwise at our last checkup 1 year ago--- screening test updated (CT chest for lung cancer screening, mammogram, pelvic all done.)  Synthroid increased to 75 mcg every morning.  Started on Effexor for perimenopausal hot flashes.  Recommendations were to repeat labs in 2 to 3 months, however this never happened.    Other interval history since last seen by me--- seen ENT (see above.)    Apparently, she is now seeing \"a weight loss/primary care provider\" in [facility] and has been on Wegovy x2 months.  This was started at low-dose but has been increased to 1 mg weekly.    She has successfully lost about 20 pounds since starting the medication 2 months ago.  And, maintains regular follow-up with her psychiatrist for ADD, no changes with Adderall.      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   Not exercising due to working long hours.  She has lost 20 pounds in the last 2 months.  Also on a good note she quit smoking about 6 weeks ago, remains on Chantix as needed.  Mood is good.  Sleep is adequate, reports sleeps very good.  No chest pain, SOA, or edema.    Today, she has a few complaints and concerns.  #1) first of all she is concerned about her labs, several abnormalities that she saw in \"my chart.\"  Again, she has been on Wegovy x2 months with no lab work done.  See above.  Wondering if she does not drink enough water?  Also on Zofran, this was prescribed with her Wegovy.  Does report nausea but no vomiting.    #2) complains of lightheadedness over the last few weeks.  Thus she decided to stop her Benicar 40/25 mg dose 2 days ago.  No chest pain, SOA    #3) just no energy, \"rundown.\"  Very good sleep.  Blames this on working over 40 to 50 hours/week.    Otherwise, needs chronic med refills.  Just had fasting lab work last week, here for review.  Several abnormalities.  Compliant with and tolerates all medications without side effects, except for the Wegovy!?!  Except no longer taking Synthroid (she has been off Synthroid x3 months) because she states this made her have nausea when taking with all her other medications in the morning.  And, it was just so inconvenient to take it 1 hour prior to other medications.  She reports that this has been happening even before starting Wegovy.        Routine health maintenance/screening test:  PAP --- RADHA secondary to bleeding, benign  MAMMO --- February 2023  Colorectal Screen --- January 2022, benign polyps, repeat 3 years  Vaccines --- due for COVID-vaccine and influenza vaccine  Smoking/ETOH Status --- reformed tobacco, recently quit again, 30 pack/day history.  Social alcohol only  Dentist, Eye Exam, Derm --- maintains regular dental visits, eye exam, and " "dermatologist  Diet/Exercise --- tries to maintain a healthy low-cholesterol diet and regular cardio exercise, however sporadic  Pertinent FH --- negative for colon cancer, premature CAD, breast cancer    Review of Systems   Constitutional:  Negative for fever and unexpected weight change.   Respiratory:  Negative for cough and shortness of breath.    Cardiovascular:  Negative for chest pain.        Subjective          Leann CLARK Self presents to [facility]    Objective   Vital Signs:   Vitals:    10/27/23 0829   BP: 90/60   BP Location: Left arm   Patient Position: Sitting   Cuff Size: Adult   Pulse: 82   Temp: 97.5 °F (36.4 °C)   SpO2: 99%   Weight: 90.5 kg (199 lb 9.6 oz)   Height: 165.1 cm (65\")      Body mass index is 33.22 kg/m².   Physical Exam  Vitals and nursing note reviewed.   Constitutional:       Appearance: Normal appearance. She is well-developed.   HENT:      Head: Normocephalic and atraumatic.      Nose: Nose normal.   Eyes:      Conjunctiva/sclera: Conjunctivae normal.      Pupils: Pupils are equal, round, and reactive to light.   Neck:      Thyroid: No thyromegaly.   Cardiovascular:      Rate and Rhythm: Normal rate and regular rhythm.      Heart sounds: Normal heart sounds. No murmur heard.  Pulmonary:      Effort: Pulmonary effort is normal.      Breath sounds: Normal breath sounds.   Abdominal:      General: Abdomen is flat. Bowel sounds are normal. There is no distension.      Palpations: Abdomen is soft. There is no hepatomegaly, splenomegaly or mass.      Tenderness: There is no abdominal tenderness. There is no guarding or rebound.      Hernia: No hernia is present.   Musculoskeletal:         General: Normal range of motion.      Cervical back: Normal range of motion and neck supple.      Right lower leg: No edema.      Left lower leg: No edema.   Lymphadenopathy:      Cervical: No cervical adenopathy.   Skin:     General: Skin is warm.      Comments: No " "dysplastic or abnormal lesions   Neurological:      General: No focal deficit present.      Mental Status: She is alert.   Psychiatric:         Mood and Affect: Mood normal.         Behavior: Behavior normal.         Thought Content: Thought content normal.         Judgment: Judgment normal.        Result Review :     Common labs          10/24/2023    10:13   Common Labs   Glucose 91    BUN 23    Creatinine 1.75    Sodium 137    Potassium 4.2    Chloride 99    Calcium 11.0    Total Protein 7.3    Albumin 4.6    Total Bilirubin 0.5    Alkaline Phosphatase 95    AST (SGOT) 23    ALT (SGPT) 27    WBC 9.04    Hemoglobin 15.4    Hematocrit 45.5    Platelets 410    Total Cholesterol 221    Triglycerides 99    HDL Cholesterol 39    LDL Cholesterol  164      TSH          10/24/2023    10:13   TSH   TSH 2.510          No results found for: \"ANADIRECT\", \"FERRITIN\", \"TESTOSTEROTT\", \"DXQT60CA\", \"FOLATE\", \"RF\", \"BNP\"       CT Sinus Without Contrast (03/16/2023 08:46)          Assessment and Plan    Diagnoses and all orders for this visit:    1. Wellness examination (Primary) --within normal limits except for BMI.  All screening up-to-date except for needs: Pelvic exam (we will schedule this in future,) influenza vaccine (done today,) COVID booster and shingles vaccine (will consider getting from pharmacy in near future.).  Recently quit smoking, doing a good job.  Repeat lung cancer screening due in March 2024.  Otherwise, continue with needed weight loss ---Needs low-carb/low calorie/low cholesterol diet and increase cardio exercise to greater than 150 minutes weekly     2. Personal history of tobacco use --recently quit smoking, may continue Chantix as needed  Repeat lung cancer screening due in March 2024    3. Familial hypercholesterolemia --remains uncontrolled  Despite improvements with a healthier diet x 2 months now still with LDL greater than 160.  Really need to consider starting treatment?  Although low risk besides " weight, past smoking history.  Family history negative.  No longer on BP meds.  Suggest checking a cardiac calcium score to further stratify need for more aggressive lipid control.  Needs low-carb/low calorie/low cholesterol diet and increase cardio exercise to greater than 150 minutes weekly   -     Comprehensive Metabolic Panel; Future  -     Lipid Panel With LDL / HDL Ratio; Future  -     CT Cardiac Calcium Score Without Dye; Future    4. MEGA (acute kidney injury) --new Dx, GFR 34!!  Strongly suspect this is due to her new weight loss med/Wegovy that was started by another provider 2 months ago.  No labs checked.  With nausea.  Recommend stopping Wegovy.  Stop all NSAIDs.  Recheck renal function in approximately 6 to 8 weeks, sooner if nausea not improved    5. Hypercalcemia --new Dx.  Asymptomatic.  Check additional lab work.  Further recommendations to follow  Recently stopped Benicar 2 days ago (25 mg of HCTZ.)  Hopefully this will help  -     PTH, Intact  -     Prolactin    6. Chronic fatigue --mild, uncontrolled  Likely secondary to MEGA and working long hours, see above HPI?  Check additional labs for completeness purposes.  May need to further work-up  -     Vitamin B12 & Folate  -     Vitamin D,25-Hydroxy    7. Essential hypertension --controlled  With episodes of lightheadedness during recent weeks, most likely due to her low blood pressures given significant weight loss.  She may remain off Benicar at this time.  Needs to monitor her blood pressure closely.  If greater than 140/90 then restart at lower dose, needs to call me.    8. Other specified hypothyroidism --appears controlled  Stopped her Synthroid about 3 months ago due to inconvenience of the dosing regimen, see above HPI.  Recent TSH appears normal.  Watch closely.  -     TSH; Future    9. Mild intermittent asthma without complication --controlled  May continue Singulair, allergy meds, as needed albuterol.  Most importantly needs to continue to  remain off all tobacco!!    10. Flu vaccine need  -     Fluzone >6 Months ([id number])      In addition to wellness exam today, seen and treated for separate and identifiable----new Dx MEGA, new Dx hypercalcemia, persistently uncontrolled hyperlipidemia, new diagnosis lightheadedness and fatigue.  Several changes, and additional lab work-up.  See above    Needs to schedule for procedure appointment/well woman/pelvic etc.  We will do this at her follow-up        Follow Up   Return in about 3 months (around 1/27/2024) for Recheck, needs 30 minutes, will need pelvic, lab first.  Patient was given instructions and counseling regarding her condition or for health maintenance advice. Please see specific information pulled into the AVS if appropriate.

## 2023-10-29 LAB
25(OH)D3+25(OH)D2 SERPL-MCNC: 57.6 NG/ML (ref 30–100)
FOLATE SERPL-MCNC: >20 NG/ML
PROLACTIN SERPL-MCNC: 6.7 NG/ML (ref 4.8–23.3)
PTH-INTACT SERPL-MCNC: 16 PG/ML (ref 15–65)
VIT B12 SERPL-MCNC: >2000 PG/ML (ref 232–1245)

## 2024-01-04 DIAGNOSIS — E78.01 FAMILIAL HYPERCHOLESTEROLEMIA: ICD-10-CM

## 2024-01-04 DIAGNOSIS — E03.8 OTHER SPECIFIED HYPOTHYROIDISM: ICD-10-CM

## 2024-01-26 LAB
ALBUMIN SERPL-MCNC: 4.6 G/DL (ref 3.5–5.2)
ALBUMIN/GLOB SERPL: 1.7 G/DL
ALP SERPL-CCNC: 87 U/L (ref 39–117)
ALT SERPL-CCNC: 34 U/L (ref 1–33)
AST SERPL-CCNC: 22 U/L (ref 1–32)
BILIRUB SERPL-MCNC: 0.5 MG/DL (ref 0–1.2)
BUN SERPL-MCNC: 18 MG/DL (ref 6–20)
BUN/CREAT SERPL: 17.1 (ref 7–25)
CALCIUM SERPL-MCNC: 10.3 MG/DL (ref 8.6–10.5)
CHLORIDE SERPL-SCNC: 101 MMOL/L (ref 98–107)
CHOLEST SERPL-MCNC: 237 MG/DL (ref 0–200)
CO2 SERPL-SCNC: 29.1 MMOL/L (ref 22–29)
CREAT SERPL-MCNC: 1.05 MG/DL (ref 0.57–1)
EGFRCR SERPLBLD CKD-EPI 2021: 64.1 ML/MIN/1.73
GLOBULIN SER CALC-MCNC: 2.7 GM/DL
GLUCOSE SERPL-MCNC: 95 MG/DL (ref 65–99)
HDLC SERPL-MCNC: 56 MG/DL (ref 40–60)
LDLC SERPL CALC-MCNC: 153 MG/DL (ref 0–100)
LDLC/HDLC SERPL: 2.68 {RATIO}
POTASSIUM SERPL-SCNC: 4.5 MMOL/L (ref 3.5–5.2)
PROT SERPL-MCNC: 7.3 G/DL (ref 6–8.5)
SODIUM SERPL-SCNC: 139 MMOL/L (ref 136–145)
TRIGL SERPL-MCNC: 155 MG/DL (ref 0–150)
TSH SERPL DL<=0.005 MIU/L-ACNC: 2.68 UIU/ML (ref 0.27–4.2)
VLDLC SERPL CALC-MCNC: 28 MG/DL (ref 5–40)

## 2024-02-13 ENCOUNTER — OFFICE VISIT (OUTPATIENT)
Dept: FAMILY MEDICINE CLINIC | Facility: CLINIC | Age: 54
End: 2024-02-13
Payer: COMMERCIAL

## 2024-02-13 VITALS
HEIGHT: 65 IN | HEART RATE: 57 BPM | OXYGEN SATURATION: 99 % | DIASTOLIC BLOOD PRESSURE: 68 MMHG | SYSTOLIC BLOOD PRESSURE: 110 MMHG | WEIGHT: 216 LBS | TEMPERATURE: 98 F | BODY MASS INDEX: 35.99 KG/M2

## 2024-02-13 DIAGNOSIS — E78.5 HYPERLIPIDEMIA, UNSPECIFIED HYPERLIPIDEMIA TYPE: ICD-10-CM

## 2024-02-13 DIAGNOSIS — Z87.891 PERSONAL HISTORY OF TOBACCO USE: ICD-10-CM

## 2024-02-13 DIAGNOSIS — N17.9 AKI (ACUTE KIDNEY INJURY): ICD-10-CM

## 2024-02-13 DIAGNOSIS — Z86.39 HISTORY OF HYPOTHYROIDISM: ICD-10-CM

## 2024-02-13 DIAGNOSIS — I10 ESSENTIAL HYPERTENSION: Primary | ICD-10-CM

## 2024-02-13 DIAGNOSIS — Z00.00 ROUTINE HEALTH MAINTENANCE: ICD-10-CM

## 2024-02-13 DIAGNOSIS — J45.20 MILD INTERMITTENT ASTHMA WITHOUT COMPLICATION: ICD-10-CM

## 2024-02-13 DIAGNOSIS — E83.52 HYPERCALCEMIA: ICD-10-CM

## 2024-02-13 PROBLEM — E03.8 OTHER SPECIFIED HYPOTHYROIDISM: Status: RESOLVED | Noted: 2020-05-18 | Resolved: 2024-02-13

## 2024-02-13 RX ORDER — ALBUTEROL SULFATE 90 UG/1
2 AEROSOL, METERED RESPIRATORY (INHALATION)
Qty: 18 G | Refills: 1 | Status: SHIPPED | OUTPATIENT
Start: 2024-02-13

## 2024-02-13 RX ORDER — KETOCONAZOLE 20 MG/ML
SHAMPOO TOPICAL
COMMUNITY
Start: 2023-12-12

## 2024-02-13 RX ORDER — AZELASTINE 1 MG/ML
2 SPRAY, METERED NASAL 2 TIMES DAILY
Qty: 1 EACH | Refills: 3 | Status: SHIPPED | OUTPATIENT
Start: 2024-02-13

## 2024-02-13 RX ORDER — CLINDAMYCIN PHOSPHATE 11.9 MG/ML
SOLUTION TOPICAL
COMMUNITY
Start: 2023-12-12

## 2024-03-01 ENCOUNTER — HOSPITAL ENCOUNTER (OUTPATIENT)
Facility: HOSPITAL | Age: 54
Discharge: HOME | End: 2024-03-01
Admitting: FAMILY MEDICINE
Payer: COMMERCIAL

## 2024-03-01 DIAGNOSIS — Z87.891 PERSONAL HISTORY OF TOBACCO USE: ICD-10-CM

## 2024-03-01 PROCEDURE — 71271 CT THORAX LUNG CANCER SCR C-: CPT

## 2024-03-19 DIAGNOSIS — E78.5 HYPERLIPIDEMIA, UNSPECIFIED HYPERLIPIDEMIA TYPE: ICD-10-CM

## 2024-03-19 DIAGNOSIS — Z86.39 HISTORY OF HYPOTHYROIDISM: ICD-10-CM

## 2024-03-21 RX ORDER — OMEPRAZOLE 40 MG/1
CAPSULE, DELAYED RELEASE ORAL
Qty: 60 CAPSULE | Refills: 12 | Status: SHIPPED | OUTPATIENT
Start: 2024-03-21

## 2024-03-21 RX ORDER — OLMESARTAN MEDOXOMIL AND HYDROCHLOROTHIAZIDE 40/25 40; 25 MG/1; MG/1
1 TABLET ORAL DAILY
Qty: 90 TABLET | Refills: 0 | Status: SHIPPED | OUTPATIENT
Start: 2024-03-21

## 2024-05-07 ENCOUNTER — TELEPHONE (OUTPATIENT)
Dept: FAMILY MEDICINE CLINIC | Facility: CLINIC | Age: 54
End: 2024-05-07
Payer: COMMERCIAL

## 2024-05-09 LAB
CHOLEST SERPL-MCNC: 227 MG/DL (ref 0–200)
HDLC SERPL-MCNC: 57 MG/DL (ref 40–60)
LDLC SERPL CALC-MCNC: 151 MG/DL (ref 0–100)
LDLC/HDLC SERPL: 2.61 {RATIO}
TRIGL SERPL-MCNC: 105 MG/DL (ref 0–150)
TSH SERPL DL<=0.005 MIU/L-ACNC: 2.6 UIU/ML (ref 0.27–4.2)
VLDLC SERPL CALC-MCNC: 19 MG/DL (ref 5–40)

## 2024-05-14 ENCOUNTER — OFFICE VISIT (OUTPATIENT)
Dept: FAMILY MEDICINE CLINIC | Facility: CLINIC | Age: 54
End: 2024-05-14
Payer: COMMERCIAL

## 2024-05-14 VITALS
SYSTOLIC BLOOD PRESSURE: 100 MMHG | DIASTOLIC BLOOD PRESSURE: 62 MMHG | TEMPERATURE: 97.6 F | BODY MASS INDEX: 35.65 KG/M2 | WEIGHT: 214 LBS | HEART RATE: 90 BPM | OXYGEN SATURATION: 98 % | HEIGHT: 65 IN

## 2024-05-14 DIAGNOSIS — I10 ESSENTIAL HYPERTENSION: ICD-10-CM

## 2024-05-14 DIAGNOSIS — Z86.39 HISTORY OF HYPOTHYROIDISM: ICD-10-CM

## 2024-05-14 DIAGNOSIS — E66.9 CLASS II OBESITY: ICD-10-CM

## 2024-05-14 DIAGNOSIS — E78.01 FAMILIAL HYPERCHOLESTEROLEMIA: Primary | ICD-10-CM

## 2024-05-14 DIAGNOSIS — J30.9 CHRONIC ALLERGIC RHINITIS: ICD-10-CM

## 2024-05-14 DIAGNOSIS — E55.9 VITAMIN D DEFICIENCY: ICD-10-CM

## 2024-05-14 PROBLEM — F90.2 ATTENTION DEFICIT HYPERACTIVITY DISORDER (ADHD), COMBINED TYPE: Status: ACTIVE | Noted: 2024-05-14

## 2024-05-14 PROBLEM — J01.01 ACUTE RECURRENT MAXILLARY SINUSITIS: Status: RESOLVED | Noted: 2023-02-07 | Resolved: 2024-05-14

## 2024-05-14 PROBLEM — T78.40XA ALLERGIES: Status: RESOLVED | Noted: 2020-05-18 | Resolved: 2024-05-14

## 2024-05-14 PROBLEM — B88.0 CHIGGER BITES: Status: RESOLVED | Noted: 2023-06-19 | Resolved: 2024-05-14

## 2024-05-14 PROBLEM — E66.812 CLASS II OBESITY: Status: ACTIVE | Noted: 2024-05-14

## 2024-05-14 PROCEDURE — 99214 OFFICE O/P EST MOD 30 MIN: CPT | Performed by: FAMILY MEDICINE

## 2024-05-14 RX ORDER — ROSUVASTATIN CALCIUM 10 MG/1
10 TABLET, COATED ORAL DAILY
Qty: 90 TABLET | Status: CANCELLED | OUTPATIENT
Start: 2024-05-14

## 2024-05-14 NOTE — PATIENT INSTRUCTIONS
Start semaglutide but at lower dose of 0.25 mg weekly    May cut Benicar in half once initiating semaglutide and some weight loss.    Recommend low fat/low calorie diet and exercise greater than 150 minutes of cardio per week.     Continue current treatment plan.

## 2024-05-14 NOTE — PROGRESS NOTES
"Chief Complaint  Hypertension (Follow up labs 3 month check up), Hypothyroidism, Asthma, and Hyperlipidemia    3-month follow-up on hyperlipidemia, HTN, weight gain/obesity management, and preventative screening    Last visit with me in February 2024 for uncontrolled lipids, uncontrolled allergic rhinitis,  follow-up after MEGA, and updated preventative screening studies  Multiple things done at last visit  --Lung cancer screening scheduled  -- Plan was to reschedule cardiac calcium score testing given uncontrolled lipids, history of HTN, and tobacco history.  -- Follow-up renal function back to normal, MEGA resolved after being off high-dose Wegovy.  -- Plan was to watch TSH and lipids closely, may need treatment?  -- Started on Astelin nasal spray for uncontrolled allergic rhinitis.  Discussed possibly following back up with ENT?    She was compliant with all the above recommendations.  Did complete her lung cancer screening study, needs to be reviewed.  Although still has not rescheduled her cardiac calcium score testing due to working long hours and finances.  Started Astelin nasal spray, allergies doing better.    Overall, doing better.  Has quit smoking, but still struggling with her weight.  Still working long hours, real estate.  Tries really hard with a healthier diet, controlled proportions, and cardio exercise.  However, does well for a few days and then \"binges.\"  At best her cardio exercises 3 days/week x 30-minute walks.  Has lost a few pounds since last visit.  Had successful weight loss on Wegovy, but had lots of nausea and secondary MEGA.  Of note, she was on high-dose Wegovy.    Maintains regular follow-up with her psychiatrist, no change with Adderall.  Mood remains good.  Sleep is adequate.  No chest pain, SOA, or palpitations.    No new concerns or complaints.  Just remains frustrated with her weight.  Had repeat fasting lab work done last week, here for review.  Had lung cancer screening done, " "here for review.  Compliant with and tolerating current medications without side effects.  No recent allergy or asthma exacerbations.        Review of Systems   Constitutional:  Negative for fever and unexpected weight change.   Respiratory:  Negative for cough and shortness of breath.    Cardiovascular:  Negative for chest pain.        Sadiq Moctezuma presents to [facility]    Objective   Vital Signs:   Vitals:    05/14/24 1409   BP: 100/62   BP Location: Left arm   Patient Position: Sitting   Cuff Size: Adult   Pulse: 90   Temp: 97.6 °F (36.4 °C)   SpO2: 98%   Weight: 97.1 kg (214 lb)   Height: 165.1 cm (65\")      Body mass index is 35.61 kg/m².   Physical Exam  Vitals and nursing note reviewed.   Constitutional:       Appearance: Normal appearance. She is well-developed. She is obese.   HENT:      Head: Normocephalic and atraumatic.      Nose: Nose normal.   Neck:      Thyroid: No thyromegaly.   Cardiovascular:      Rate and Rhythm: Normal rate and regular rhythm.      Heart sounds: Normal heart sounds.   Pulmonary:      Effort: Pulmonary effort is normal. No respiratory distress.      Breath sounds: Normal breath sounds. No wheezing or rales.   Abdominal:      General: Abdomen is flat. Bowel sounds are normal. There is no distension.      Palpations: Abdomen is soft.      Tenderness: There is no abdominal tenderness.   Musculoskeletal:      Cervical back: Normal range of motion and neck supple.      Right lower leg: No edema.      Left lower leg: No edema.   Lymphadenopathy:      Cervical: No cervical adenopathy.   Neurological:      Mental Status: She is alert.        Result Review :     Common labs          10/24/2023    10:13 1/26/2024    10:42 5/8/2024    13:16   Common Labs   Glucose 91  95     BUN 23  18     Creatinine 1.75  1.05     Sodium 137  139     Potassium 4.2  4.5     Chloride 99  101     Calcium 11.0  10.3     Total Protein 7.3  7.3     Albumin 4.6  " 4.6     Total Bilirubin 0.5  0.5     Alkaline Phosphatase 95  87     AST (SGOT) 23  22     ALT (SGPT) 27  34     WBC 9.04      Hemoglobin 15.4      Hematocrit 45.5      Platelets 410      Total Cholesterol 221  237  227    Triglycerides 99  155  105    HDL Cholesterol 39  56  57    LDL Cholesterol  164  153  151      Lipid Panel          10/24/2023    10:13 1/26/2024    10:42 5/8/2024    13:16   Lipid Panel   Total Cholesterol 221  237  227    Triglycerides 99  155  105    HDL Cholesterol 39  56  57    VLDL Cholesterol 18  28  19    LDL Cholesterol  164  153  151    LDL/HDL Ratio  2.68  2.61      TSH          10/24/2023    10:13 1/26/2024    10:42 5/8/2024    13:16   TSH   TSH 2.510  2.680  2.600          Lab Results   Component Value Date    IYKU35RV 57.6 10/27/2023    FOLATE >20.0 10/27/2023        CT Chest Low Dose Cancer Screening WO (03/01/2024 08:18) -- NO CORONARY ARTERY CALCIFICATION SEEN.  Conclusion: Stable sub-5 mm left upper lobe pulmonary nodules. Low lung  volumes with dependent atelectasis versus less likely bibasilar  ground-glass infiltrate.     Lung Rads category: 2     Recommendations: 12-month low-dose CT screening chest. Follow-up  atelectasis/ground-glass infiltrate under clinical criteria.                 Assessment and Plan    Diagnoses and all orders for this visit:    1. Familial hypercholesterolemia (Primary) --remains uncontrolled  History of HTN, history of tobacco abuse.  Negative family history.  No coronary calcification.  Prefers to hold on medication at this time.  Really trying hard for weight loss and better diet/exercise.  Continue to monitor at this time.  Needs low-carb/low calorie/low cholesterol diet and increase cardio exercise to greater than 150 minutes weekly   -     Lipid Panel With LDL / HDL Ratio; Future    2. Class II obesity --remains uncontrolled, BMI 35.6  Would like to restart semaglutide through compounding pharmacy, but remain on low-dose of 0.25 mg weekly.  Do  not plan on increasing dose until reevaluated in 3 months.  Watch renal function.  Benefits and risk discussed.  Thyroid studies normal.  Needs low-carb/low calorie/low cholesterol diet and increase cardio exercise to greater than 150 minutes weekly   -     Basic Metabolic Panel; Future    3. Essential hypertension --well-controlled  Once she initiates semaglutide and has some weight loss, may decrease Benicar to 1/2 tablet daily  -     Basic Metabolic Panel; Future    4. History of hypothyroidism --stable.  Thyroid studies remain normal off medication.    6. Chronic allergic rhinitis --controlled, doing better with the addition of Astelin.        Follow Up   Return in about 3 months (around 8/14/2024) for Recheck.  Patient was given instructions and counseling regarding her condition or for health maintenance advice. Please see specific information pulled into the AVS if appropriate.

## 2024-05-29 ENCOUNTER — TELEPHONE (OUTPATIENT)
Dept: FAMILY MEDICINE CLINIC | Facility: CLINIC | Age: 54
End: 2024-05-29
Payer: COMMERCIAL

## 2024-05-29 NOTE — TELEPHONE ENCOUNTER
"  Caller: SelfLeann    Relationship: Self    Best call back number: [phone]*    What medication are you requesting: SEMAGLUTIDE COMPOUND    If a prescription is needed, what is your preferred pharmacy and phone number: [facility] \"COMPOUNDS ONLY\" - [place], [place] - [address] [phone] [facility] [phone]      Additional notes: PATIENT CALLING STATING THAT [facility] HAS NOT RECEIVED THE ORDER FOR THE SEMAGLUTIDE COMPOUND THAT WAS TO BE CALLED INTO THEM.          "

## 2024-06-14 ENCOUNTER — OFFICE VISIT (OUTPATIENT)
Dept: FAMILY MEDICINE CLINIC | Facility: CLINIC | Age: 54
End: 2024-06-14
Payer: COMMERCIAL

## 2024-06-14 VITALS
BODY MASS INDEX: 34.99 KG/M2 | DIASTOLIC BLOOD PRESSURE: 70 MMHG | TEMPERATURE: 98 F | HEART RATE: 77 BPM | WEIGHT: 210 LBS | SYSTOLIC BLOOD PRESSURE: 112 MMHG | HEIGHT: 65 IN | OXYGEN SATURATION: 99 %

## 2024-06-14 DIAGNOSIS — R21 RASH: Primary | ICD-10-CM

## 2024-06-14 PROCEDURE — 99213 OFFICE O/P EST LOW 20 MIN: CPT | Performed by: NURSE PRACTITIONER

## 2024-06-14 RX ORDER — TRIAMCINOLONE ACETONIDE 5 MG/G
1 OINTMENT TOPICAL 2 TIMES DAILY
Qty: 15 G | Refills: 0 | Status: SHIPPED | OUTPATIENT
Start: 2024-06-14

## 2024-06-14 RX ORDER — DOXYCYCLINE HYCLATE 100 MG/1
200 CAPSULE ORAL ONCE
Qty: 2 CAPSULE | Refills: 0 | Status: SHIPPED | OUTPATIENT
Start: 2024-06-14 | End: 2024-06-14

## 2024-06-14 RX ORDER — HYDROXYZINE HYDROCHLORIDE 10 MG/1
10 TABLET, FILM COATED ORAL 3 TIMES DAILY PRN
Qty: 30 TABLET | Refills: 0 | Status: SHIPPED | OUTPATIENT
Start: 2024-06-14

## 2024-06-14 NOTE — PROGRESS NOTES
"Chief Complaint  Insect Bite (Pt concerned for chigger or mite bites on L foot and trunk. Pt states this happened last year and turned into cellulitis. Bites happened last Friday, itching started Sunday. Tick bite in between 4th-5th L toes for 2-4D.)    Subjective        Leann Moctezuma presents to [facility]  History of Present Illness  Leann Moctezuma is a 53 y.o. female who presents to clinic today with concerns of insect bites that are causing itching.  She was doing yard work and cleaning up leaves. She unsure what bit her. She has been using Benadryl cream and neosporin.  She had a similar issue last year and developed cellulitis.  She also had a tick bite that she noticed between her fourth and fifth left toe about a week ago.  She removed the tick 4-5 days ago.  She denies fever, muscle aches, or chills.    Objective   Vital Signs:  /70   Pulse 77   Temp 98 °F (36.7 °C)   Ht 165.1 cm (65\")   Wt 95.3 kg (210 lb)   SpO2 99%   BMI 34.95 kg/m²   Estimated body mass index is 34.95 kg/m² as calculated from the following:    Height as of this encounter: 165.1 cm (65\").    Weight as of this encounter: 95.3 kg (210 lb).           Physical Exam  Vitals reviewed.   Constitutional:       General: She is not in acute distress.     Appearance: Normal appearance. She is not ill-appearing, toxic-appearing or diaphoretic.   HENT:      Head: Normocephalic and atraumatic.   Eyes:      General: No scleral icterus.        Right eye: No discharge.         Left eye: No discharge.      Extraocular Movements: Extraocular movements intact.   Pulmonary:      Effort: Pulmonary effort is normal. No respiratory distress.   Skin:     Findings: Rash (right buttock, right groin, abdomen) present. Rash is papular.      Comments: Erythematous papule between 4th-5th digit of left foot.  No erythema migrans.   Neurological:      General: No focal deficit present.      Mental Status: She is alert and oriented " to person, place, and time.      Motor: No weakness.      Gait: Gait normal.   Psychiatric:         Mood and Affect: Mood normal.         Behavior: Behavior normal.        Result Review :                     Assessment and Plan     Diagnoses and all orders for this visit:    1. Rash (Primary)  -     doxycycline (VIBRAMYCIN) 100 MG capsule; Take 2 capsules by mouth 1 (One) Time for 1 dose.  Dispense: 2 capsule; Refill: 0  -     hydrOXYzine (ATARAX) 10 MG tablet; Take 1 tablet by mouth 3 (Three) Times a Day As Needed for Itching.  Dispense: 30 tablet; Refill: 0  -     triamcinolone (KENALOG) 0.5 % ointment; Apply 1 Application topically to the appropriate area as directed 2 (Two) Times a Day.  Dispense: 15 g; Refill: 0      Prophylacticly treat with doxycycline x 1 dose. No systemic symptoms or erythema migrans. No signs of infection. Treat itching with steroid ointment and hydroxyzine as needed. She is aware of potential adverse effects. Call if develop systemic symptoms.          Follow Up     Return if symptoms worsen or fail to improve.  Patient was given instructions and counseling regarding her condition or for health maintenance advice. Please see specific information pulled into the AVS if appropriate.       Electronically signed by MATHIEU Saez, 06/14/24, 9:37 AM EDT.

## 2024-07-09 ENCOUNTER — OFFICE VISIT (OUTPATIENT)
Dept: FAMILY MEDICINE CLINIC | Facility: CLINIC | Age: 54
End: 2024-07-09
Payer: COMMERCIAL

## 2024-07-09 VITALS
BODY MASS INDEX: 34.79 KG/M2 | HEART RATE: 78 BPM | HEIGHT: 65 IN | DIASTOLIC BLOOD PRESSURE: 70 MMHG | SYSTOLIC BLOOD PRESSURE: 112 MMHG | OXYGEN SATURATION: 98 % | TEMPERATURE: 98.2 F | WEIGHT: 208.8 LBS

## 2024-07-09 DIAGNOSIS — Z00.00 ROUTINE HEALTH MAINTENANCE: ICD-10-CM

## 2024-07-09 DIAGNOSIS — E66.9 CLASS II OBESITY: Primary | ICD-10-CM

## 2024-07-09 DIAGNOSIS — I10 ESSENTIAL HYPERTENSION: ICD-10-CM

## 2024-07-09 PROCEDURE — 99214 OFFICE O/P EST MOD 30 MIN: CPT | Performed by: FAMILY MEDICINE

## 2024-07-09 RX ORDER — PHENTERMINE HYDROCHLORIDE 37.5 MG/1
37.5 CAPSULE ORAL EVERY MORNING
Qty: 30 CAPSULE | Refills: 2 | Status: SHIPPED | OUTPATIENT
Start: 2024-07-09

## 2024-07-09 RX ORDER — TOPIRAMATE 50 MG/1
TABLET, FILM COATED ORAL
Qty: 60 TABLET | Refills: 3 | Status: SHIPPED | OUTPATIENT
Start: 2024-07-09

## 2024-07-09 RX ORDER — BUPROPION HYDROCHLORIDE 150 MG/1
150 TABLET ORAL DAILY
Status: CANCELLED
Start: 2024-07-09

## 2024-07-09 NOTE — PROGRESS NOTES
"Chief Complaint  Weight Check (Could not tolerate the Semaglutide had bad side effects constipation nausea etc. Discuss other options)    2-month follow-up on weight management/obesity, HTN    Last visit with me in May 2024 for chronic med refills/lab follow-up, lung cancer screening follow-up, and weight management plan/obesity.   -- Screening studies reviewed, no coronary calcification found on lung cancer screening.  Thus, not started on lipid medication.  -- Desired to restart semaglutide at low-dose for needed weight loss.  Discussed in depth.  Low risk patient.  However, had GI side effects with higher dose Wegovy in past.  Therefore plan was to start at 0.25 mg weekly until further evaluation.    She was compliant with the above recommendations.  Did start semaglutide at 0.25 mg weekly x 6 weeks, but had to stop due to bad side effects.  Started again with constipation and nausea, \"just not worth it.\"  Other interval history since last seen by me --seen here last month for acute care visit for rash with NP/Ramona, records reviewed  Office Visit with Lian Greene APRN (06/14/2024) --treated for infected chigger bites with doxycycline, resolved      Overall, doing better.  Has quit smoking, but still struggling with her weight.  Still working long hours, real estate.  Tries really hard with a healthier diet, controlled proportions, and cardio exercise.  However, does well for a few days and then \"binges.\"  Her cardio exercise is intermittent, ranges between 60 to 100 minutes weekly.  Active lifestyle.  Enjoys swimming.  She has lost about 6 pounds since last visit 2 months ago.     Maintains regular follow-up with her psychiatrist, no change with Adderall.  Mood remains good.  Sleep is adequate.  No chest pain, SOA, or palpitations.     No new concerns or complaints.  Just remains frustrated with her weight.  Requesting to consider other weight loss medication options.  Had Wellbutrin in the past for smoking " "cessation, but had side effects.  Never tried Topamax in the past.  Treated in the past with phentermine by weight loss specialist, had successful weight loss and tolerated.    Labs up-to-date.  Requesting labs for next visit.  Compliant with and tolerating current medications without side effects.  No recent allergy or asthma exacerbations.        Review of Systems   Constitutional:  Negative for fever and unexpected weight change.   Respiratory:  Negative for cough and shortness of breath.    Cardiovascular:  Negative for chest pain.        Sadiq CLARK Self presents to [facility]    Objective   Vital Signs:   Vitals:    07/09/24 1428   BP: 112/70   BP Location: Right arm   Patient Position: Sitting   Cuff Size: Adult   Pulse: 78   Temp: 98.2 °F (36.8 °C)   SpO2: 98%   Weight: 94.7 kg (208 lb 12.8 oz)   Height: 165.1 cm (65\")      Body mass index is 34.75 kg/m².   Physical Exam  Vitals and nursing note reviewed.   Constitutional:       Appearance: Normal appearance. She is well-developed and overweight.   HENT:      Head: Normocephalic and atraumatic.      Nose: Nose normal.   Eyes:      Conjunctiva/sclera: Conjunctivae normal.      Pupils: Pupils are equal, round, and reactive to light.   Neck:      Thyroid: No thyromegaly.   Cardiovascular:      Rate and Rhythm: Normal rate and regular rhythm.      Heart sounds: Normal heart sounds. No murmur heard.  Pulmonary:      Effort: Pulmonary effort is normal. No respiratory distress.      Breath sounds: Normal breath sounds. No wheezing or rales.   Abdominal:      General: Abdomen is flat. Bowel sounds are normal. There is no distension.      Palpations: Abdomen is soft. There is no hepatomegaly, splenomegaly or mass.      Tenderness: There is no abdominal tenderness. There is no guarding or rebound.      Hernia: No hernia is present.   Musculoskeletal:         General: Normal range of motion.      Cervical back: Normal range " of motion and neck supple.      Right lower leg: No edema.      Left lower leg: No edema.   Lymphadenopathy:      Cervical: No cervical adenopathy.   Skin:     General: Skin is warm.   Neurological:      General: No focal deficit present.      Mental Status: She is alert.   Psychiatric:         Mood and Affect: Mood normal.         Behavior: Behavior normal.         Thought Content: Thought content normal.         Judgment: Judgment normal.        Result Review :     Common labs          10/24/2023    10:13 1/26/2024    10:42 5/8/2024    13:16   Common Labs   Glucose 91  95     BUN 23  18     Creatinine 1.75  1.05     Sodium 137  139     Potassium 4.2  4.5     Chloride 99  101     Calcium 11.0  10.3     Total Protein 7.3  7.3     Albumin 4.6  4.6     Total Bilirubin 0.5  0.5     Alkaline Phosphatase 95  87     AST (SGOT) 23  22     ALT (SGPT) 27  34     WBC 9.04      Hemoglobin 15.4      Hematocrit 45.5      Platelets 410      Total Cholesterol 221  237  227    Triglycerides 99  155  105    HDL Cholesterol 39  56  57    LDL Cholesterol  164  153  151      Lipid Panel          10/24/2023    10:13 1/26/2024    10:42 5/8/2024    13:16   Lipid Panel   Total Cholesterol 221  237  227    Triglycerides 99  155  105    HDL Cholesterol 39  56  57    VLDL Cholesterol 18  28  19    LDL Cholesterol  164  153  151    LDL/HDL Ratio  2.68  2.61      TSH          10/24/2023    10:13 1/26/2024    10:42 5/8/2024    13:16   TSH   TSH 2.510  2.680  2.600            Lab Results   Component Value Date    [id number] 57.6 10/27/2023    FOLATE >20.0 10/27/2023                   Assessment and Plan    Diagnoses and all orders for this visit:    1. Class II obesity (Primary) --remains uncontrolled, BMI 34.7  Continues to struggle with successful weight loss and maintained weight loss.  Failed GLP-1's due to GI side effects.  Side effects of Wellbutrin in past.  Would like to restart phentermine.  Counseled about risk and benefits, would  prescribe x 2 to 3 months only.  Blood pressure well-controlled.  Also suggest trying Topamax, started at low-dose and titrate to side effects.  Potential side effects discussed.  Would like to see her start 1 medication at a time, consider trying Topamax first x 4 to 6 weeks then may add phentermine if desired.  Stressed the importance of improving upon a healthy lifestyle --Needs low-carb/low calorie/low cholesterol diet and increase cardio exercise to GREATER THAN 150 minutes weekly   History of hypothyroidism, plan to recheck thyroid studies prior to next visit.  -     phentermine 37.5 MG capsule; Take 1 capsule by mouth Every Morning.  Dispense: 30 capsule; Refill: 2  -     TSH  -     T4, Free    2. BMI 34.0-34.9,adult --uncontrolled, BMI 34.75, see above plan    3. Essential hypertension --controlled  Watch closely given starting phentermine.  Plan to continue Benicar HCT 40/25 mg daily  Labs prior to next visit  -     CBC & Differential  -     Comprehensive Metabolic Panel  -     Lipid Panel With LDL / HDL Ratio    4. Routine health maintenance --screening labs placed, please obtain prior to follow-up visit in 3 months for wellness exam.    Other orders  -     topiramate (Topamax) 50 MG tablet; 1/2 tablet p.o. daily x 7 days then may increase to 1 whole tablet p.o. daily x 7 days then may increase to 1 tablet twice a day  Dispense: 60 tablet; Refill: 3        Follow Up   Return in about 3 months (around 10/9/2024) for Annual physical.  Patient was given instructions and counseling regarding her condition or for health maintenance advice. Please see specific information pulled into the AVS if appropriate.

## 2024-07-09 NOTE — PATIENT INSTRUCTIONS
Start Topamax as directed--50 mg take 1/2 tablet p.o. daily x 7 days then increase to 1 whole tablet p.o. daily x 7 days then increase to 1 p.o. twice daily    Then after 4 to 6 weeks may start phentermine if desired    Recommend low fat/low calorie diet and exercise GREATER THAN 150 MINUTES of cardio per week.

## 2024-07-13 NOTE — PROGRESS NOTES
Subjective     Chief Complaint   Patient presents with    Gynecologic Exam     New GYN, Annual exam, Last Pap unknown, Last Mammogram 2023, Last Colonoscopy 2022, Pt has no complaints today, Doing well        History of Present Illness    Leann Moctezuma is a 53 y.o.  who is scheduled for a new pt annual exam.    She is a  and doing some [employer] driving at times. She is single.   She had hyst in  for bleeding and ovaries remain. She notes hot flashes started around . She still has some hot flashes that are improved but the ones at night are still difficult.     Obstetric History:  OB History          1    Para   1    Term   1            AB        Living   1         SAB        IAB        Ectopic        Molar        Multiple        Live Births   1               Menstrual History:     No LMP recorded. Patient has had a hysterectomy.         Current contraception: status post hysterectomy  History of abnormal Pap smear: yes - about 20 yrs ago with neg f/u. Hyst for abnormal bleeding. Pt told benign path. 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   Received Gardasil immunization: no  Perform regular self breast exam:  yes  Family history of uterine or ovarian cancer: no  Family History of colon cancer: no  Family history of breast cancer: no    Mammogram: ordered.  Colonoscopy: up to date, done 2022, f/u 3 yrs per GI MD  DEXA: not indicated.    Exercise: moderately active  Calcium/Vitamin D: adequate intake    The following portions of the patient's history were reviewed and updated as appropriate: allergies, current medications, past family history, past medical history, past social history, past surgical history, and problem list.    Review of Systems    Review of Systems   Constitutional: Negative for fatigue.   Respiratory: Negative for shortness of breath.    Gastrointestinal: Negative for abdominal pain.   Genitourinary: Negative for vag  bleeding or pelvic pain  Neurological: Positive  "for  occ headaches.   Psychiatric/Behavioral: Negative for dysphoric mood.   Endocrine: positive for occ hot flashes        Objective   Physical Exam    /80   Ht 165.1 cm (65\")   Wt 95.5 kg (210 lb 9.6 oz)   BMI 35.05 kg/m²   General:   Alert, in no distress   Heart: regular rate and rhythm   Lungs: clear to auscultation bilaterally   Breast: Inspection is negative. Left breast is without masses, retractions, nipple discharge or axillary adenopathy. Right breast is without masses, retractions, nipple discharge or axillary adenopathy.     Neck: Supple, no thyromegaly   Abdomen: Soft, no tenderness or guarding   Pelvis: External genitalia: normal general appearance  Urinary system: urethral meatus normal  Vaginal: normal mucosa without prolapse or lesions  Cervix: removed surgically, cuff without lesions  Adnexa: no masses or tenderness  Uterus: removed surgically   Extremities: Normal without edema   Neurologic: Alert and oriented   Psychiatric: Normal affect, judgment and mood     Assessment & Plan   Diagnoses and all orders for this visit:    1. Encounter for gynecological examination (Primary)    2. Encounter for screening mammogram for malignant neoplasm of breast    3. Visit for screening mammogram  -     Mammo screening digital tomosynthesis bilateral w CAD; Future    4. Vasomotor symptoms due to menopause        All questions answered.  Breast self exam technique reviewed and patient encouraged to perform self-exam monthly.  Discussed healthy lifestyle modifications.  Recommended 30 minutes of aerobic exercise five times per week.  Discussed calcium / Vit D needs to prevent osteoporosis.  Ordered mammogram and pt agrees to book asap. Advised her to call if she does not receive results within 2 weeks of imaging.     Discussed hot flashes and options for management including ERT, Veozah and Brisdelle. Pt prefers to avoid treatment at this time.           "

## 2024-07-15 ENCOUNTER — OFFICE VISIT (OUTPATIENT)
Dept: OBSTETRICS AND GYNECOLOGY | Age: 54
End: 2024-07-15
Payer: COMMERCIAL

## 2024-07-15 ENCOUNTER — HOSPITAL ENCOUNTER (OUTPATIENT)
Facility: HOSPITAL | Age: 54
Discharge: HOME | End: 2024-07-15
Admitting: OBSTETRICS & GYNECOLOGY
Payer: COMMERCIAL

## 2024-07-15 VITALS
WEIGHT: 210.6 LBS | DIASTOLIC BLOOD PRESSURE: 80 MMHG | SYSTOLIC BLOOD PRESSURE: 116 MMHG | BODY MASS INDEX: 35.09 KG/M2 | HEIGHT: 65 IN

## 2024-07-15 DIAGNOSIS — N95.1 VASOMOTOR SYMPTOMS DUE TO MENOPAUSE: ICD-10-CM

## 2024-07-15 DIAGNOSIS — Z12.31 VISIT FOR SCREENING MAMMOGRAM: ICD-10-CM

## 2024-07-15 DIAGNOSIS — Z01.419 ENCOUNTER FOR GYNECOLOGICAL EXAMINATION: Primary | ICD-10-CM

## 2024-07-15 DIAGNOSIS — Z12.31 SCREENING MAMMOGRAM FOR BREAST CANCER: Primary | ICD-10-CM

## 2024-07-15 DIAGNOSIS — Z12.31 ENCOUNTER FOR SCREENING MAMMOGRAM FOR MALIGNANT NEOPLASM OF BREAST: ICD-10-CM

## 2024-07-15 PROCEDURE — 77067 SCR MAMMO BI INCL CAD: CPT

## 2024-07-15 PROCEDURE — 99386 PREV VISIT NEW AGE 40-64: CPT | Performed by: OBSTETRICS & GYNECOLOGY

## 2024-07-15 PROCEDURE — 77063 BREAST TOMOSYNTHESIS BI: CPT

## 2024-07-16 ENCOUNTER — PATIENT ROUNDING (BHMG ONLY) (OUTPATIENT)
Dept: OBSTETRICS AND GYNECOLOGY | Age: 54
End: 2024-07-16
Payer: COMMERCIAL

## 2024-07-16 NOTE — PROGRESS NOTES
A MY CHART MESSAGE HAS BEEN SENT TO THE PATIENT FOR [facility].

## 2024-10-16 ENCOUNTER — OFFICE VISIT (OUTPATIENT)
Dept: FAMILY MEDICINE CLINIC | Facility: CLINIC | Age: 54
End: 2024-10-16
Payer: COMMERCIAL

## 2024-10-16 VITALS
TEMPERATURE: 97 F | DIASTOLIC BLOOD PRESSURE: 80 MMHG | WEIGHT: 211.8 LBS | SYSTOLIC BLOOD PRESSURE: 110 MMHG | OXYGEN SATURATION: 96 % | HEIGHT: 65 IN | HEART RATE: 73 BPM | BODY MASS INDEX: 35.29 KG/M2

## 2024-10-16 DIAGNOSIS — Z86.39 HISTORY OF HYPOTHYROIDISM: ICD-10-CM

## 2024-10-16 DIAGNOSIS — Z00.00 WELLNESS EXAMINATION: Primary | ICD-10-CM

## 2024-10-16 DIAGNOSIS — E66.812 OBESITY, CLASS II, BMI 35-39.9: ICD-10-CM

## 2024-10-16 DIAGNOSIS — Z23 ENCOUNTER FOR VACCINATION: ICD-10-CM

## 2024-10-16 DIAGNOSIS — J45.20 MILD INTERMITTENT ASTHMA WITHOUT COMPLICATION: ICD-10-CM

## 2024-10-16 DIAGNOSIS — Z86.0100 HISTORY OF COLON POLYPS: ICD-10-CM

## 2024-10-16 DIAGNOSIS — E78.01 FAMILIAL HYPERCHOLESTEROLEMIA: ICD-10-CM

## 2024-10-16 DIAGNOSIS — Z78.9 MEDICALLY COMPLEX PATIENT: ICD-10-CM

## 2024-10-16 DIAGNOSIS — Z87.891 PERSONAL HISTORY OF TOBACCO USE: ICD-10-CM

## 2024-10-16 DIAGNOSIS — M79.645 PAIN OF LEFT THUMB: ICD-10-CM

## 2024-10-16 DIAGNOSIS — I10 ESSENTIAL HYPERTENSION: ICD-10-CM

## 2024-10-16 DIAGNOSIS — R53.82 CHRONIC FATIGUE: ICD-10-CM

## 2024-10-16 PROCEDURE — 90677 PCV20 VACCINE IM: CPT | Performed by: FAMILY MEDICINE

## 2024-10-16 PROCEDURE — 90656 IIV3 VACC NO PRSV 0.5 ML IM: CPT | Performed by: FAMILY MEDICINE

## 2024-10-16 PROCEDURE — 90471 IMMUNIZATION ADMIN: CPT | Performed by: FAMILY MEDICINE

## 2024-10-16 PROCEDURE — 99214 OFFICE O/P EST MOD 30 MIN: CPT | Performed by: FAMILY MEDICINE

## 2024-10-16 PROCEDURE — 99396 PREV VISIT EST AGE 40-64: CPT | Performed by: FAMILY MEDICINE

## 2024-10-16 PROCEDURE — 90472 IMMUNIZATION ADMIN EACH ADD: CPT | Performed by: FAMILY MEDICINE

## 2024-10-16 RX ORDER — PHENTERMINE HYDROCHLORIDE 37.5 MG/1
37.5 TABLET ORAL EVERY MORNING
COMMUNITY
Start: 2024-09-17 | End: 2024-10-16

## 2024-10-16 RX ORDER — EZETIMIBE 10 MG/1
10 TABLET ORAL DAILY
Qty: 30 TABLET | Refills: 5 | Status: SHIPPED | OUTPATIENT
Start: 2024-10-16

## 2024-10-16 NOTE — PROGRESS NOTES
"Chief Complaint  Annual Exam, Hyperlipidemia, and Hypertension (Follow-up on weight management, and complains of arthritic joints, especially left thumb)    NEEDS ANNUAL WELLNESS  And  3-month follow-up on hyperlipidemia, HTN, weight gain/weight management, obesity  And  6-month follow-up chronic allergies, asthma, chronic fatigue, and arthritis.    Last visit with me in July 2024 for uncontrolled lipids, uncontrolled obesity/weight loss management plans  --At that visit, no treatment initiated for lipids given No coronary artery calcification and wanted the chance to improve upon a healthier diet and needed weight loss.  -- Started on both phentermine and Topamax to help with weight loss.  Failed GLP-1's in past due to significant GI side effects, MEGA.    She was compliant with the above recommendations.  Did start both phentermine and Topamax, tolerating without any side effects.  However, no significant weight loss since last visit 3 months ago.    Doing better overall.  Finally feels like she is at a place in life where things have stabilized, things have settled down (\"chipping away at a few financial obligations.\")  Really has made some improvements with a much healthier diet within the last 6 months.  Exercise continues to wax and wane.  Weight is essentially stable since last visit even despite phentermine and Topamax.  Quit smoking 1 year ago.  Still working long hours in [employer].       Maintains regular follow-up with her psychiatrist, no change with Adderall.  Although sometimes reports she may not take her second dose given she has been on phentermine recently?  Mood remains good.  Sleep is good.  No chest pain, SOA, or palpitations.     Today, just remains frustrated with her weight.  Also does report more achy joints, especially her left thumb.  On occasion will awake with pain in her left thumb.  Has not noticed any swelling or warmth.  Multiple arthritic joints for which she takes OTC NSAIDs.  No " "reported family history of rheumatoid arthritis.    Had fasting lab work done last week, here for review  Compliant with and tolerating current medications without side effects.  No recent allergy or asthma exacerbations.    Routine health maintenance/screening test:  PAP --- RADHA secondary to bleeding, benign.  Sees GYN  MAMMO --- July 2024  Colorectal Screen --- January 2022, benign polyps, repeat 3 years  Vaccines --- due for COVID-vaccine, influenza vaccine, and pneumococcal vaccine  Smoking/ETOH Status --- quit September 2023, 30 pack/day history.  Lung Ca screening up-to-date..  Social alcohol only  Dentist, Eye Exam, Derm --- maintains regular dental visits, eye exam, and dermatologist  Diet/Exercise --- maintains a healthy low-cholesterol diet and regular cardio exercise, however sporadic  Pertinent FH --- negative for colon cancer, premature CAD, breast cancer         Review of Systems   Constitutional:  Negative for unexpected weight change.   Respiratory:  Negative for shortness of breath.    Cardiovascular:  Negative for chest pain.   Musculoskeletal:  Positive for arthralgias.        Subjective          Leann CLARK Self presents to [facility]    Objective   Vital Signs:   Vitals:    10/16/24 0959   BP: 110/80   Pulse: 73   Temp: 97 °F (36.1 °C)   SpO2: 96%   Weight: 96.1 kg (211 lb 12.8 oz)   Height: 165.1 cm (65\")      Body mass index is 35.25 kg/m².   Physical Exam  Vitals and nursing note reviewed.   Constitutional:       Appearance: Normal appearance. She is well-developed. She is obese.   HENT:      Head: Normocephalic and atraumatic.      Nose: Nose normal.   Eyes:      Conjunctiva/sclera: Conjunctivae normal.      Pupils: Pupils are equal, round, and reactive to light.   Neck:      Thyroid: No thyromegaly.   Cardiovascular:      Rate and Rhythm: Normal rate and regular rhythm.      Heart sounds: Normal heart sounds. No murmur heard.  Pulmonary:      Effort: Pulmonary " effort is normal. No respiratory distress.      Breath sounds: Normal breath sounds. No wheezing or rales.   Abdominal:      General: Abdomen is flat. Bowel sounds are normal. There is no distension.      Palpations: Abdomen is soft. There is no hepatomegaly, splenomegaly or mass.      Tenderness: There is no abdominal tenderness. There is no guarding or rebound.      Hernia: No hernia is present.   Musculoskeletal:         General: Normal range of motion.      Cervical back: Normal range of motion and neck supple.      Right lower leg: No edema.      Left lower leg: No edema.      Comments: Left hand/thumb --- no synovitis, but mildly prominent MCP changes, Heberden's nodes in bilateral hands, mild   Lymphadenopathy:      Cervical: No cervical adenopathy.   Skin:     General: Skin is warm.   Neurological:      General: No focal deficit present.      Mental Status: She is alert.   Psychiatric:         Mood and Affect: Mood normal.         Behavior: Behavior normal.         Thought Content: Thought content normal.         Judgment: Judgment normal.        Result Review :     Common labs          1/26/2024    10:42 5/8/2024    13:16 10/9/2024    10:41   Common Labs   Glucose 95   87    BUN 18   14    Creatinine 1.05   0.93    Sodium 139   139    Potassium 4.5   4.8    Chloride 101   102    Calcium 10.3   10.3    Total Protein 7.3      Albumin 4.6      Total Bilirubin 0.5      Alkaline Phosphatase 87      AST (SGOT) 22      ALT (SGPT) 34      Total Cholesterol 237  227  256    Triglycerides 155  105  120    HDL Cholesterol 56  57  54    LDL Cholesterol  153  151  181      Lipid Panel          1/26/2024    10:42 5/8/2024    13:16 10/9/2024    10:41   Lipid Panel   Total Cholesterol 237  227  256    Triglycerides 155  105  120    HDL Cholesterol 56  57  54    VLDL Cholesterol 28  19  21    LDL Cholesterol  153  151  181    LDL/HDL Ratio 2.68  2.61  3.30      TSH          10/24/2023    10:13 1/26/2024    10:42 5/8/2024     13:16   TSH   TSH 2.510  2.680  2.600                  Lab Results   Component Value Date    KGLH67IW 57.6 10/27/2023    FOLATE >20.0 10/27/2023                   Assessment and Plan    Diagnoses and all orders for this visit:    1. Wellness examination (Primary) --WNL except for BMI of 35.2  Has GYN provider.  Mammogram up-to-date.  Status post RADHA.  All screening studies and labs up-to-date.  Needs pneumococcal 20 vaccine, influenza, and COVID vaccines.  Otherwise, continue to improve upon healthy lifestyle --Needs low-carb/low calorie/low cholesterol diet and increase cardio exercise to greater than 150 minutes weekly     2. Personal history of tobacco use --quit September 2023, next low-dose CT chest due in spring 2025    3. History of colon polyps --due for repeat C-scope in January 2025    4. Obesity, Class II, BMI 35-39.9 --remains uncontrolled, BMI 35.2  Despite healthier lifestyle, phentermine and Topamax x last 3 months, no significant improvement with needed weight loss.  Suspect possible thyroid?  Menopausal related?  Recently quit smoking as well.  Failed GLP-1 meds.  Prefers to hold on any additional weight loss medication.  Really believes she is at a point in life now that she can make some significant improvements with her exercise routine.    5. Familial hypercholesterolemia --remains uncontrolled  Despite healthier diet and good adherence to low-cholesterol diet, LDL remains very high.  Given history of HTN, do recommend starting treatment.  Will try Zetia 10 mg daily.  If LDL is not significantly improved in 3 months, will need a statin.  Low risk, no coronary calcification seen on lung cancer screening.  Continue with low-cholesterol diet --Needs low-carb/low calorie/low cholesterol diet and increase cardio exercise to greater than 150 minutes weekly   -     Hepatic Function Panel  -     Comprehensive Metabolic Panel; Future  -     Lipid Panel With LDL / HDL Ratio; Future    6. Essential  hypertension --controlled  No change with Benicar, refill upon request    7. History of hypothyroidism --uncontrolled?  Given difficulty with weight loss despite weight loss medication, and do suspect may be Hashimoto's?  -     Thyroid Peroxidase Antibody  -     T4, Free  -     TSH    8. Mild intermittent asthma without complication --controlled  Doing well with Singulair and as needed albuterol, refill upon request    9. Chronic fatigue --stable  Adequate sleep.  Possible QUAN but still declines workup  Recheck screening labs  -     CBC & Differential  -     BRAYAN  -     Thyroid Peroxidase Antibody  -     T4, Free  -     TSH    10. Pain of left thumb --new Dx  Suspect general OA?  However will screen for inflammatory markers  -     BRAYAN  -     Rheumatoid Factor  -     Sedimentation Rate    11. Encounter for vaccination  -     Fluzone >6mos  -     Pneumococcal Conjugate Vaccine 20-Valent (PCV20)    12. Medically complex patient --See all the above active chronic diagnoses that require close monitoring and longitudinal care.     Other orders  -     ezetimibe (Zetia) 10 MG tablet; Take 1 tablet by mouth Daily.  Dispense: 30 tablet; Refill: 5    In addition to wellness exam today, seen and treated for separate and identifiable --- uncontrolled hyperlipidemia, uncontrolled obesity, and new diagnosis left thumb pain        Follow Up   Return in about 3 months (around 1/16/2025) for Recheck.  Patient was given instructions and counseling regarding her condition or for health maintenance advice. Please see specific information pulled into the AVS if appropriate.

## 2024-10-16 NOTE — PATIENT INSTRUCTIONS
May get shingles vaccine from pharmacy in near future    Further recommendations based on lab work today      Start Zetia 10 mg 1 p.o. daily for cholesterol    Recommend low fat/low calorie diet and exercise greater than 150 minutes of cardio per week.      Continue current treatment plan.

## 2024-10-17 PROBLEM — M79.645 PAIN OF LEFT THUMB: Status: ACTIVE | Noted: 2024-10-17

## 2024-10-17 LAB
ALBUMIN SERPL-MCNC: 4.4 G/DL (ref 3.5–5.2)
ALP SERPL-CCNC: 86 U/L (ref 39–117)
ALT SERPL-CCNC: 21 U/L (ref 1–33)
ANA SER QL: POSITIVE
AST SERPL-CCNC: 21 U/L (ref 1–32)
BASOPHILS # BLD AUTO: 0.06 10*3/MM3 (ref 0–0.2)
BASOPHILS NFR BLD AUTO: 0.6 % (ref 0–1.5)
BILIRUB DIRECT SERPL-MCNC: <0.2 MG/DL (ref 0–0.3)
BILIRUB SERPL-MCNC: 0.4 MG/DL (ref 0–1.2)
DSDNA AB SER-ACNC: 15 IU/ML (ref 0–9)
EOSINOPHIL # BLD AUTO: 0.18 10*3/MM3 (ref 0–0.4)
EOSINOPHIL NFR BLD AUTO: 1.8 % (ref 0.3–6.2)
ERYTHROCYTE [DISTWIDTH] IN BLOOD BY AUTOMATED COUNT: 12 % (ref 12.3–15.4)
HCT VFR BLD AUTO: 43.2 % (ref 34–46.6)
HGB BLD-MCNC: 14.2 G/DL (ref 12–15.9)
IMM GRANULOCYTES # BLD AUTO: 0.02 10*3/MM3 (ref 0–0.05)
IMM GRANULOCYTES NFR BLD AUTO: 0.2 % (ref 0–0.5)
LYMPHOCYTES # BLD AUTO: 4.48 10*3/MM3 (ref 0.7–3.1)
LYMPHOCYTES NFR BLD AUTO: 44.8 % (ref 19.6–45.3)
Lab: ABNORMAL
MCH RBC QN AUTO: 28.6 PG (ref 26.6–33)
MCHC RBC AUTO-ENTMCNC: 32.9 G/DL (ref 31.5–35.7)
MCV RBC AUTO: 87.1 FL (ref 79–97)
MONOCYTES # BLD AUTO: 0.57 10*3/MM3 (ref 0.1–0.9)
MONOCYTES NFR BLD AUTO: 5.7 % (ref 5–12)
NEUTROPHILS # BLD AUTO: 4.68 10*3/MM3 (ref 1.7–7)
NEUTROPHILS NFR BLD AUTO: 46.9 % (ref 42.7–76)
NRBC BLD AUTO-RTO: 0 /100 WBC (ref 0–0.2)
PLATELET # BLD AUTO: 363 10*3/MM3 (ref 140–450)
PROT SERPL-MCNC: 7.1 G/DL (ref 6–8.5)
RBC # BLD AUTO: 4.96 10*6/MM3 (ref 3.77–5.28)
T4 FREE SERPL-MCNC: 1.42 NG/DL (ref 0.92–1.68)
THYROPEROXIDASE AB SERPL-ACNC: 13 IU/ML (ref 0–34)
TSH SERPL DL<=0.005 MIU/L-ACNC: 4.41 UIU/ML (ref 0.27–4.2)
WBC # BLD AUTO: 9.99 10*3/MM3 (ref 3.4–10.8)

## 2024-10-22 ENCOUNTER — OFFICE VISIT (OUTPATIENT)
Dept: FAMILY MEDICINE CLINIC | Facility: CLINIC | Age: 54
End: 2024-10-22
Payer: COMMERCIAL

## 2024-10-22 VITALS
BODY MASS INDEX: 35.19 KG/M2 | HEART RATE: 79 BPM | OXYGEN SATURATION: 98 % | HEIGHT: 65 IN | SYSTOLIC BLOOD PRESSURE: 102 MMHG | RESPIRATION RATE: 16 BRPM | WEIGHT: 211.2 LBS | TEMPERATURE: 97.5 F | DIASTOLIC BLOOD PRESSURE: 76 MMHG

## 2024-10-22 DIAGNOSIS — J32.9 RECURRENT SINUSITIS: ICD-10-CM

## 2024-10-22 DIAGNOSIS — J30.2 SEASONAL ALLERGIES: ICD-10-CM

## 2024-10-22 DIAGNOSIS — J11.1 INFLUENZA: ICD-10-CM

## 2024-10-22 DIAGNOSIS — U07.1 RESPIRATORY TRACT INFECTION DUE TO COVID-19 VIRUS: ICD-10-CM

## 2024-10-22 DIAGNOSIS — J98.8 RESPIRATORY TRACT INFECTION DUE TO COVID-19 VIRUS: ICD-10-CM

## 2024-10-22 DIAGNOSIS — R68.89 FLU-LIKE SYMPTOMS: Primary | ICD-10-CM

## 2024-10-22 LAB
EXPIRATION DATE: ABNORMAL
EXPIRATION DATE: ABNORMAL
FLUAV AG NPH QL: POSITIVE
FLUBV AG NPH QL: NEGATIVE
INTERNAL CONTROL: ABNORMAL
INTERNAL CONTROL: ABNORMAL
Lab: ABNORMAL
Lab: ABNORMAL
SARS-COV-2 AG UPPER RESP QL IA.RAPID: DETECTED

## 2024-10-22 PROCEDURE — 87804 INFLUENZA ASSAY W/OPTIC: CPT | Performed by: FAMILY MEDICINE

## 2024-10-22 PROCEDURE — 99214 OFFICE O/P EST MOD 30 MIN: CPT | Performed by: FAMILY MEDICINE

## 2024-10-22 PROCEDURE — 87426 SARSCOV CORONAVIRUS AG IA: CPT | Performed by: FAMILY MEDICINE

## 2024-10-22 NOTE — PROGRESS NOTES
Sadiq Moctezuma is a 53 y.o. female.     Chief Complaint   Patient presents with    Nasal Congestion     Mucus yellow green. Week ago felt bad Saturday morning.     Cough     Drainage.    Sinus Problem     pain    Fatigue       History of Present Illness       She is c/o cough , congestion , sinus pressure with yellow green drainage, feeling fatigue for 1 week. Sx worse over last 4 days , feeling fatigue.  No F/C   She has know SA, taking Rx from OTC  Has recurrent sinusitis, her sx worse lately due to weather changes          The following portions of the patient's history were reviewed and updated as appropriate: allergies, current medications, past family history, past medical history, past social history, past surgical history, and problem list.        Review of Systems   HENT:  Positive for congestion, postnasal drip, rhinorrhea and sinus pressure.    Respiratory:  Positive for cough.        Vitals:    10/22/24 1109   BP: 102/76   Pulse: 79   Resp: 16   Temp: 97.5 °F (36.4 °C)   SpO2: 98%           10/22/24  1109   Weight: 95.8 kg (211 lb 3.2 oz)         Body mass index is 35.15 kg/m².      Current Outpatient Medications   Medication Sig Dispense Refill    albuterol sulfate  (90 Base) MCG/ACT inhaler Inhale 2 puffs 4 (Four) Times a Day. 18 g 1    amphetamine-dextroamphetamine (ADDERALL) 20 MG tablet Take 1 tablet by mouth 2 (Two) Times a Day.      APO-Varenicline 1 MG tablet TAKE 1 (ONE) TABLET BY MOUTH TWICE DAILY 60 tablet 3    azelastine (ASTELIN) 0.1 % nasal spray 2 sprays into the nostril(s) as directed by provider 2 (Two) Times a Day. Use in each nostril as directed 1 each 3    clindamycin (CLEOCIN T) 1 % external solution APPLY TO AFFECTED AREA(S) ON SCALP ONCE DAILY AS NEEDED      ezetimibe (Zetia) 10 MG tablet Take 1 tablet by mouth Daily. 30 tablet 5    montelukast (SINGULAIR) 10 MG tablet TAKE 1 (ONE) TABLET BY MOUTH NIGHTLY 90 tablet 1    olmesartan-hydrochlorothiazide (BENICAR  HCT) 40-25 MG per tablet TAKE 1 (ONE) TABLET BY MOUTH ONCE DAILY 90 tablet 0    omeprazole (priLOSEC) 40 MG capsule TAKE 1 (ONE) CAPSULE BY MOUTH TWICE DAILY 60 capsule 12    ondansetron ODT (ZOFRAN-ODT) 8 MG disintegrating tablet Place 1 tablet on the tongue Every 8 (Eight) Hours As Needed.      topiramate (Topamax) 50 MG tablet 1/2 tablet p.o. daily x 7 days then may increase to 1 whole tablet p.o. daily x 7 days then may increase to 1 tablet twice a day 60 tablet 3    triamcinolone (KENALOG) 0.5 % ointment Apply 1 Application topically to the appropriate area as directed 2 (Two) Times a Day. 15 g 0    amoxicillin-clavulanate (AUGMENTIN) 875-125 MG per tablet Take 1 tablet by mouth 2 (Two) Times a Day for 10 days. 20 tablet 0     No current facility-administered medications for this visit.                Objective   Physical Exam  Vitals and nursing note reviewed.   Constitutional:       General: She is not in acute distress.     Appearance: She is not toxic-appearing.   HENT:      Nose: Congestion and rhinorrhea present.      Right Sinus: Maxillary sinus tenderness and frontal sinus tenderness present.      Left Sinus: Maxillary sinus tenderness and frontal sinus tenderness present.   Cardiovascular:      Rate and Rhythm: Normal rate and regular rhythm.      Heart sounds: Normal heart sounds. No murmur heard.  Pulmonary:      Effort: Pulmonary effort is normal. No respiratory distress.      Breath sounds: Normal breath sounds. No stridor. No wheezing or rhonchi.   Neurological:      Mental Status: She is alert and oriented to person, place, and time.   Psychiatric:         Mood and Affect: Mood normal.         Behavior: Behavior normal.         Thought Content: Thought content normal.           Assessment & Plan   Diagnoses and all orders for this visit:    1. Flu-like symptoms (Primary)  Comments:  +ve covid and flu  Orders:  -     POCT Influenza A/B  -     POCT KELLIE SARS-CoV-2 Antigen NASIR    2.  Influenza  Comments:  discussed onset of sx , today is D 4 , no need for Flomax , she agree  discussed supportive care    3. Respiratory tract infection due to COVID-19 virus  Comments:  discussed onset of sx , today is D 4   discussed Paxlovid, she agreed to hold for now as her sx not bad  discussed supportive care    4. Recurrent sinusitis  Comments:  that is her main concern due to worseninhg sinus pressure   will start on Rx  Orders:  -     amoxicillin-clavulanate (AUGMENTIN) 875-125 MG per tablet; Take 1 tablet by mouth 2 (Two) Times a Day for 10 days.  Dispense: 20 tablet; Refill: 0    5. Seasonal allergies  Comments:  continue OTC Rx            I spent 32 minutes caring for this patient on this date of service. This time includes time spent by me in the following activities:preparing for the visit,reviewing previous medical records,  performing a medically appropriate examination and/or evaluation, counseling and educating the patient/family/caregiver, and documenting information in the medical record.       Patient was given instructions and counseling regarding her condition or for health maintenance advice. Please see specific information pulled into the AVS if appropriate.         I have fully discussed the nature of the medical condition(s) risks, complications, management, safe and proper use of medications.   Pt stated no allergy to the above prescribed medication.  I have discussed the SIDE EFFECT OF MEDICATION and importance TO report any side effect , the patient expressed good understanding.  Encouraged medication compliance and the importance of keeping scheduled follow up appointments with me and any other providers.    Patient instructed to follow up with our office for results on any labs/imaging ordered during this visit.    Home care discussed  All questions answered  Patient verbalizes understanding and agrees to treatment plan.     Follow up: Return for if no better or worsening  symptoms.

## 2024-10-29 RX ORDER — OLMESARTAN MEDOXOMIL AND HYDROCHLOROTHIAZIDE 40/25 40; 25 MG/1; MG/1
1 TABLET ORAL DAILY
Qty: 90 TABLET | Refills: 1 | Status: SHIPPED | OUTPATIENT
Start: 2024-10-29

## 2024-11-11 ENCOUNTER — TELEPHONE (OUTPATIENT)
Dept: GASTROENTEROLOGY | Facility: CLINIC | Age: 54
End: 2024-11-11
Payer: COMMERCIAL

## 2024-11-11 NOTE — TELEPHONE ENCOUNTER
LAST C/S  1/20/22   IN EPIC     PERSONAL HX OF POLYPS    NO FAMILY HX OF POLYPS    NO FAMILY HX OF COLON CA    NO ASA OR BLOOD THINNERS              LIST OF  MEDICATIONS  BENICAR  OMEPRAZOLE  ADDSARAHL  RAHUL SEN QUESTIONNAIRE SCANNED IN MEDIA

## 2024-11-12 ENCOUNTER — PREP FOR SURGERY (OUTPATIENT)
Dept: OTHER | Facility: HOSPITAL | Age: 54
End: 2024-11-12
Payer: COMMERCIAL

## 2024-11-12 DIAGNOSIS — Z12.11 ENCOUNTER FOR SCREENING FOR MALIGNANT NEOPLASM OF COLON: Primary | ICD-10-CM

## 2024-11-13 ENCOUNTER — TELEPHONE (OUTPATIENT)
Dept: GASTROENTEROLOGY | Facility: CLINIC | Age: 54
End: 2024-11-13
Payer: COMMERCIAL

## 2024-11-20 ENCOUNTER — TELEPHONE (OUTPATIENT)
Dept: GASTROENTEROLOGY | Facility: CLINIC | Age: 54
End: 2024-11-20
Payer: COMMERCIAL

## 2024-11-25 ENCOUNTER — TELEPHONE (OUTPATIENT)
Dept: GASTROENTEROLOGY | Facility: CLINIC | Age: 54
End: 2024-11-25
Payer: COMMERCIAL

## 2024-12-12 RX ORDER — MONTELUKAST SODIUM 10 MG/1
TABLET ORAL
Qty: 90 TABLET | Refills: 1 | Status: SHIPPED | OUTPATIENT
Start: 2024-12-12

## 2024-12-17 DIAGNOSIS — E78.01 FAMILIAL HYPERCHOLESTEROLEMIA: ICD-10-CM

## 2025-01-13 DIAGNOSIS — R11.0 NAUSEA: ICD-10-CM

## 2025-01-13 LAB
ALBUMIN SERPL-MCNC: 4.1 G/DL (ref 3.5–5.2)
ALBUMIN/GLOB SERPL: 1.5 G/DL
ALP SERPL-CCNC: 77 U/L (ref 39–117)
ALT SERPL-CCNC: 31 U/L (ref 1–33)
AST SERPL-CCNC: 24 U/L (ref 1–32)
BILIRUB SERPL-MCNC: 0.4 MG/DL (ref 0–1.2)
BUN SERPL-MCNC: 14 MG/DL (ref 6–20)
BUN/CREAT SERPL: 15.9 (ref 7–25)
CALCIUM SERPL-MCNC: 9.7 MG/DL (ref 8.6–10.5)
CHLORIDE SERPL-SCNC: 104 MMOL/L (ref 98–107)
CHOLEST SERPL-MCNC: 206 MG/DL (ref 0–200)
CO2 SERPL-SCNC: 25.9 MMOL/L (ref 22–29)
CREAT SERPL-MCNC: 0.88 MG/DL (ref 0.57–1)
EGFRCR SERPLBLD CKD-EPI 2021: 78.7 ML/MIN/1.73
GLOBULIN SER CALC-MCNC: 2.7 GM/DL
GLUCOSE SERPL-MCNC: 92 MG/DL (ref 65–99)
HDLC SERPL-MCNC: 50 MG/DL (ref 40–60)
LDLC SERPL CALC-MCNC: 133 MG/DL (ref 0–100)
LDLC/HDLC SERPL: 2.62 {RATIO}
POTASSIUM SERPL-SCNC: 4.4 MMOL/L (ref 3.5–5.2)
PROT SERPL-MCNC: 6.8 G/DL (ref 6–8.5)
SODIUM SERPL-SCNC: 139 MMOL/L (ref 136–145)
TRIGL SERPL-MCNC: 126 MG/DL (ref 0–150)
VLDLC SERPL CALC-MCNC: 23 MG/DL (ref 5–40)

## 2025-01-13 RX ORDER — ONDANSETRON 8 MG/1
TABLET, ORALLY DISINTEGRATING ORAL
Qty: 90 TABLET | Refills: 5 | Status: SHIPPED | OUTPATIENT
Start: 2025-01-13

## 2025-01-17 ENCOUNTER — OFFICE VISIT (OUTPATIENT)
Dept: FAMILY MEDICINE CLINIC | Facility: CLINIC | Age: 55
End: 2025-01-17
Payer: COMMERCIAL

## 2025-01-17 VITALS
WEIGHT: 214.8 LBS | HEIGHT: 65 IN | OXYGEN SATURATION: 97 % | HEART RATE: 100 BPM | BODY MASS INDEX: 35.79 KG/M2 | TEMPERATURE: 97.6 F | DIASTOLIC BLOOD PRESSURE: 60 MMHG | SYSTOLIC BLOOD PRESSURE: 100 MMHG

## 2025-01-17 DIAGNOSIS — J45.20 MILD INTERMITTENT ASTHMA WITHOUT COMPLICATION: ICD-10-CM

## 2025-01-17 DIAGNOSIS — R76.8 POSITIVE ANA (ANTINUCLEAR ANTIBODY): ICD-10-CM

## 2025-01-17 DIAGNOSIS — E78.01 FAMILIAL HYPERCHOLESTEROLEMIA: Primary | ICD-10-CM

## 2025-01-17 DIAGNOSIS — M79.641 BILATERAL HAND PAIN: ICD-10-CM

## 2025-01-17 DIAGNOSIS — Z78.9 MEDICALLY COMPLEX PATIENT: ICD-10-CM

## 2025-01-17 DIAGNOSIS — Z86.0100 HISTORY OF COLON POLYPS: ICD-10-CM

## 2025-01-17 DIAGNOSIS — R79.89 ABNORMAL TSH: ICD-10-CM

## 2025-01-17 DIAGNOSIS — E66.812 OBESITY, CLASS II, BMI 35-39.9: ICD-10-CM

## 2025-01-17 DIAGNOSIS — M79.642 BILATERAL HAND PAIN: ICD-10-CM

## 2025-01-17 DIAGNOSIS — J30.9 CHRONIC ALLERGIC RHINITIS: ICD-10-CM

## 2025-01-17 DIAGNOSIS — Z87.891 PERSONAL HISTORY OF TOBACCO USE: ICD-10-CM

## 2025-01-17 DIAGNOSIS — E55.9 VITAMIN D DEFICIENCY: ICD-10-CM

## 2025-01-17 DIAGNOSIS — I10 ESSENTIAL HYPERTENSION: ICD-10-CM

## 2025-01-17 PROBLEM — Z86.39 HISTORY OF HYPOTHYROIDISM: Status: RESOLVED | Noted: 2024-02-13 | Resolved: 2025-01-17

## 2025-01-17 PROCEDURE — 99214 OFFICE O/P EST MOD 30 MIN: CPT | Performed by: FAMILY MEDICINE

## 2025-01-17 RX ORDER — TOPIRAMATE 50 MG/1
TABLET, FILM COATED ORAL
Qty: 60 TABLET | Refills: 3 | Status: CANCELLED | OUTPATIENT
Start: 2025-01-17

## 2025-01-17 RX ORDER — EZETIMIBE 10 MG/1
10 TABLET ORAL DAILY
Qty: 30 TABLET | Refills: 5 | Status: CANCELLED | OUTPATIENT
Start: 2025-01-17

## 2025-01-17 NOTE — PROGRESS NOTES
"Chief Complaint  Hyperlipidemia (3 month follow up) and Hypertension (Also needs follow-up on multiple lab abnormalities from last visit 3 months ago)    3-month follow-up on hyperlipidemia and abnormal lab studies (TSH, BRAYAN)  And  6-month follow-up on HTN, asthma, tobacco history, colon polyp history, obesity, vitamin D deficiency    Last visit with me in October 2024 for wellness exam, uncontrolled lipids, uncontrolled fatigue, weight gain, and workup for autoimmune/inflammatory markers  -- Screening studies updated, including colonoscopy referral.  This is planned for spring 2025  -- Added Zetia due to persistent uncontrolled lipids in the setting of HTN  -- Screen for autoimmune and inflammatory markers given history of chronic arthralgias and fatigue    Labs resulted as below  Progress Notes  Jess Ricks MD (Physician)  Family Medicine  Positive BRAYAN and elevated double-stranded DNA.  Given symptoms, referral to rheumatologist.  Where are the rest of her studies, rheumatoid factor and sed rate?  Still in computer as active.  Also new onset hypothyroidism--- May start Synthroid at 25 mcg every morning.  Please discussed proper dosing regimen.  Follow-up with me in 2 to 3 months on all the above        She did start the Zetia, tolerating without side effects.  Never started Synthroid, never referred to rheumatology given she believes her abnormal labs were due to having COVID at the time of blood draw.  Prefers to recheck labs before starting new med/Synthroid.    Doing better overall.  Finally feels like she is at a place in life where things have stabilized, things have settled down (\"chipping away at a few financial obligations.\")  Really has made some improvements with a much healthier diet within the last 6 months.  Exercise continues to wax and wane.  Weight is essentially stable since last visit even despite Topamax.  Quit smoking 1 year ago.  Still working long hours in real state.        Maintains " "regular follow-up with her psychiatrist, no change with Adderall.   Mood remains good.  Sleep is good.  No chest pain, SOA, or palpitations.     Today, still reports bilateral hand pain.  Has not noticed any swelling or warmth.  Multiple arthritic joints for which she takes OTC NSAIDs.  No reported family history of rheumatoid arthritis, although father's history is unknown.     Had fasting lab work done last week, here for review  Compliant with and tolerating current medications without side effects, including Zetia.    Previously treated with Synthroid for hypothyroidism, but quit taking the medication due to the inconvenience of dosing regimen and the fact that she did not notice much of a difference with her weight.  No recent allergy or asthma exacerbations.      Review of Systems   Constitutional:  Negative for fever and unexpected weight change.   Respiratory:  Negative for cough and shortness of breath.    Cardiovascular:  Negative for chest pain.        Sadiq Moctezuma presents to [facility]    Objective   Vital Signs:   Vitals:    01/17/25 1006   BP: 100/60   BP Location: Left arm   Patient Position: Sitting   Cuff Size: Adult   Pulse: 100   Temp: 97.6 °F (36.4 °C)   SpO2: 97%   Weight: 97.4 kg (214 lb 12.8 oz)   Height: 165.1 cm (65\")      Body mass index is 35.74 kg/m².   Physical Exam  Vitals and nursing note reviewed.   Constitutional:       Appearance: Normal appearance. She is well-developed. She is obese.   HENT:      Head: Normocephalic and atraumatic.      Nose: Nose normal.   Eyes:      Conjunctiva/sclera: Conjunctivae normal.      Pupils: Pupils are equal, round, and reactive to light.   Neck:      Thyroid: No thyromegaly.   Cardiovascular:      Rate and Rhythm: Normal rate and regular rhythm.      Heart sounds: Normal heart sounds. No murmur heard.  Pulmonary:      Effort: Pulmonary effort is normal. No respiratory distress.      Breath sounds: " Normal breath sounds. No wheezing or rales.   Abdominal:      General: Abdomen is flat. Bowel sounds are normal. There is no distension.      Palpations: Abdomen is soft. There is no hepatomegaly, splenomegaly or mass.      Tenderness: There is no abdominal tenderness. There is no guarding or rebound.      Hernia: No hernia is present.   Musculoskeletal:         General: Normal range of motion.      Cervical back: Normal range of motion and neck supple.      Right lower leg: No edema.      Left lower leg: No edema.      Comments: Bilateral hands --no synovitis, no joint asymmetry, minimal Heberden's nodes only   Lymphadenopathy:      Cervical: No cervical adenopathy.   Skin:     General: Skin is warm.   Neurological:      General: No focal deficit present.      Mental Status: She is alert.   Psychiatric:         Mood and Affect: Mood normal.         Behavior: Behavior normal.         Thought Content: Thought content normal.         Judgment: Judgment normal.        Result Review :     Common labs          10/9/2024    10:41 10/16/2024    11:41 1/13/2025    10:37   Common Labs   Glucose 87   92    BUN 14   14    Creatinine 0.93   0.88    Sodium 139   139    Potassium 4.8   4.4    Chloride 102   104    Calcium 10.3   9.7    Total Protein  7.1  6.8    Albumin  4.4  4.1    Total Bilirubin  0.4  0.4    Alkaline Phosphatase  86  77    AST (SGOT)  21  24    ALT (SGPT)  21  31    WBC  9.99     Hemoglobin  14.2     Hematocrit  43.2     Platelets  363     Total Cholesterol 256   206    Triglycerides 120   126    HDL Cholesterol 54   50    LDL Cholesterol  181   133      Lipid Panel          5/8/2024    13:16 10/9/2024    10:41 1/13/2025    10:37   Lipid Panel   Total Cholesterol 227  256  206    Triglycerides 105  120  126    HDL Cholesterol 57  54  50    VLDL Cholesterol 19  21  23    LDL Cholesterol  151  181  133    LDL/HDL Ratio 2.61  3.30  2.62      TSH          1/26/2024    10:42 5/8/2024    13:16 10/16/2024    11:41    TSH   TSH 2.680  2.600  4.410            Lab Results   Component Value Date    ANADIRECT Positive (A) 10/16/2024    [id number] 57.6 10/27/2023    FOLATE >20.0 10/27/2023      October 2024 --sed rate and RF factor never completed, still listed as active  Thyroid Peroxidase Antibody (10/16/2024 11:41)       REFLEXED DNA/DS (10/16/2024 11:41) --15  BRAYAN (10/16/2024 11:41)          Assessment and Plan    Diagnoses and all orders for this visit:    1. Familial hypercholesterolemia (Primary) --controlled  Much improved since starting Zetia 10 mg daily at last visit 3 months ago.  Ideally goal LDL needs to be less than 100, at least less than 130 given history of HTN.  Low risk patient otherwise, no coronary calcification.  Plan to continue Zetia 10 mg daily  Continue to improve upon healthy lifestyle ---    2. Essential hypertension --controlled  No change with Benicar  Electrolytes and renal function up-to-date, WNL    3. Obesity, Class II, BMI 35-39.9 --remains uncontrolled, BMI 35.7  Essentially weight unchanged despite improvements with healthier lifestyle, although recent holidays.  Recheck TSH, may need to restart treatment for hypothyroidism?  May continue Topamax as prescribed.  S/P pheniramine in recent past.  -     TSH    4. Abnormal TSH --new Dx  Never did start Synthroid as requested at last visit due to having COVID.  Desires retesting today.  Relatively asymptomatic besides weight gain.  Further recommendations to follow, may need to restart Synthroid?    5. Positive BRAYAN (antinuclear antibody) --new Dx  False positive?  Desires retesting, had COVID at last visit.  No significant family history.  Bilateral hand pain, arthralgias but no significant joint changes or synovitis.  Check autoimmune and inflammatory markers.  Further recommendations to follow.  -     Rheumatoid Factor  -     Sedimentation Rate  -     Cancel: BRAYAN by IFA, Reflex to Titer and Pattern; Future  -     BRAYAN by IFA, Reflex to Titer and  Pattern    6. Bilateral hand pain --needs uncontrolled  General OA versus autoimmune/inflammatory?  See above plan  May continue NSAIDs as needed  -     Rheumatoid Factor  -     Sedimentation Rate    7. Personal history of tobacco use --due for lung cancer screening after March 2025  -      CT Chest Low Dose Cancer Screening WO; Future    8. History of colon polyps --due for repeat C-scope, this is planned for February 2025    9. Chronic allergic rhinitis --stable, no change with Singulair or Astelin    10. Vitamin D deficiency  -     Vitamin D,25-Hydroxy    11. Mild intermittent asthma without complication --controlled  May continue Singulair and as needed albuterol    12. Medically complex patient -- See all the above active chronic diagnoses that require close monitoring and longitudinal care.        Follow Up   Return in about 6 months (around 7/17/2025) for Recheck.  Patient was given instructions and counseling regarding her condition or for health maintenance advice. Please see specific information pulled into the AVS if appropriate.

## 2025-01-17 NOTE — PATIENT INSTRUCTIONS
Further recommendations based on today's lab work--- referral to rheumatologist?  May need to start Synthroid?    No change with medications    Continue to improve upon healthy lifestyle---Needs low-carb/low calorie/low cholesterol diet and increase cardio exercise to greater than 150 minutes weekly

## 2025-01-20 LAB
25(OH)D3+25(OH)D2 SERPL-MCNC: 38.8 NG/ML (ref 30–100)
ANA HOMOGEN TITR SER: ABNORMAL {TITER}
ANA SER QL IF: POSITIVE
ERYTHROCYTE [SEDIMENTATION RATE] IN BLOOD BY WESTERGREN METHOD: 2 MM/HR (ref 0–40)
Lab: ABNORMAL
RHEUMATOID FACT SERPL-ACNC: 188.1 IU/ML
TSH SERPL DL<=0.005 MIU/L-ACNC: 3.12 UIU/ML (ref 0.45–4.5)

## 2025-01-27 DIAGNOSIS — R76.8 POSITIVE ANA (ANTINUCLEAR ANTIBODY): Primary | ICD-10-CM

## 2025-01-27 DIAGNOSIS — M05.80 POLYARTHRITIS WITH POSITIVE RHEUMATOID FACTOR: ICD-10-CM

## 2025-02-15 ENCOUNTER — HOSPITAL ENCOUNTER (OUTPATIENT)
Facility: HOSPITAL | Age: 55
Setting detail: OBSERVATION
Discharge: HOME | End: 2025-02-16
Attending: EMERGENCY MEDICINE | Admitting: EMERGENCY MEDICINE
Payer: COMMERCIAL

## 2025-02-15 DIAGNOSIS — T78.2XXA ANAPHYLAXIS, INITIAL ENCOUNTER: Primary | ICD-10-CM

## 2025-02-15 LAB
ALBUMIN SERPL-MCNC: 4 G/DL (ref 3.5–5.2)
ALBUMIN/GLOB SERPL: 1.4 G/DL
ALP SERPL-CCNC: 71 U/L (ref 39–117)
ALT SERPL W P-5'-P-CCNC: 18 U/L (ref 1–33)
ANION GAP SERPL CALCULATED.3IONS-SCNC: 11.8 MMOL/L (ref 5–15)
AST SERPL-CCNC: 21 U/L (ref 1–32)
BASOPHILS # BLD AUTO: 0.05 10*3/MM3 (ref 0–0.2)
BASOPHILS NFR BLD AUTO: 0.4 % (ref 0–1.5)
BILIRUB SERPL-MCNC: 0.2 MG/DL (ref 0–1.2)
BUN SERPL-MCNC: 18 MG/DL (ref 6–20)
BUN/CREAT SERPL: 18.9 (ref 7–25)
CALCIUM SPEC-SCNC: 8.9 MG/DL (ref 8.6–10.5)
CHLORIDE SERPL-SCNC: 100 MMOL/L (ref 98–107)
CO2 SERPL-SCNC: 24.2 MMOL/L (ref 22–29)
CREAT SERPL-MCNC: 0.95 MG/DL (ref 0.57–1)
DEPRECATED RDW RBC AUTO: 39.5 FL (ref 37–54)
EGFRCR SERPLBLD CKD-EPI 2021: 71.8 ML/MIN/1.73
EOSINOPHIL # BLD AUTO: 0.3 10*3/MM3 (ref 0–0.4)
EOSINOPHIL NFR BLD AUTO: 2.2 % (ref 0.3–6.2)
ERYTHROCYTE [DISTWIDTH] IN BLOOD BY AUTOMATED COUNT: 12.4 % (ref 12.3–15.4)
GLOBULIN UR ELPH-MCNC: 2.8 GM/DL
GLUCOSE SERPL-MCNC: 161 MG/DL (ref 65–99)
HCT VFR BLD AUTO: 43.2 % (ref 34–46.6)
HGB BLD-MCNC: 14.1 G/DL (ref 12–15.9)
IMM GRANULOCYTES # BLD AUTO: 0.05 10*3/MM3 (ref 0–0.05)
IMM GRANULOCYTES NFR BLD AUTO: 0.4 % (ref 0–0.5)
LYMPHOCYTES # BLD AUTO: 6.6 10*3/MM3 (ref 0.7–3.1)
LYMPHOCYTES NFR BLD AUTO: 48 % (ref 19.6–45.3)
MAGNESIUM SERPL-MCNC: 1.9 MG/DL (ref 1.6–2.6)
MCH RBC QN AUTO: 28.4 PG (ref 26.6–33)
MCHC RBC AUTO-ENTMCNC: 32.6 G/DL (ref 31.5–35.7)
MCV RBC AUTO: 87.1 FL (ref 79–97)
MONOCYTES # BLD AUTO: 0.57 10*3/MM3 (ref 0.1–0.9)
MONOCYTES NFR BLD AUTO: 4.1 % (ref 5–12)
NEUTROPHILS NFR BLD AUTO: 44.9 % (ref 42.7–76)
NEUTROPHILS NFR BLD AUTO: 6.17 10*3/MM3 (ref 1.7–7)
NRBC BLD AUTO-RTO: 0 /100 WBC (ref 0–0.2)
PLATELET # BLD AUTO: 424 10*3/MM3 (ref 140–450)
PMV BLD AUTO: 10.4 FL (ref 6–12)
POTASSIUM SERPL-SCNC: 3 MMOL/L (ref 3.5–5.2)
PROT SERPL-MCNC: 6.8 G/DL (ref 6–8.5)
QT INTERVAL: 416 MS
QTC INTERVAL: 470 MS
RBC # BLD AUTO: 4.96 10*6/MM3 (ref 3.77–5.28)
SODIUM SERPL-SCNC: 136 MMOL/L (ref 136–145)
WBC NRBC COR # BLD AUTO: 13.74 10*3/MM3 (ref 3.4–10.8)

## 2025-02-15 PROCEDURE — 93005 ELECTROCARDIOGRAM TRACING: CPT | Performed by: EMERGENCY MEDICINE

## 2025-02-15 PROCEDURE — 96374 THER/PROPH/DIAG INJ IV PUSH: CPT

## 2025-02-15 PROCEDURE — 25010000002 METHYLPREDNISOLONE PER 125 MG: Performed by: NURSE PRACTITIONER

## 2025-02-15 PROCEDURE — 96376 TX/PRO/DX INJ SAME DRUG ADON: CPT

## 2025-02-15 PROCEDURE — G0378 HOSPITAL OBSERVATION PER HR: HCPCS

## 2025-02-15 PROCEDURE — 96375 TX/PRO/DX INJ NEW DRUG ADDON: CPT

## 2025-02-15 PROCEDURE — 36415 COLL VENOUS BLD VENIPUNCTURE: CPT

## 2025-02-15 PROCEDURE — 25810000003 SODIUM CHLORIDE 0.9 % SOLUTION: Performed by: NURSE PRACTITIONER

## 2025-02-15 PROCEDURE — 83735 ASSAY OF MAGNESIUM: CPT | Performed by: NURSE PRACTITIONER

## 2025-02-15 PROCEDURE — 85025 COMPLETE CBC W/AUTO DIFF WBC: CPT | Performed by: EMERGENCY MEDICINE

## 2025-02-15 PROCEDURE — 25010000002 METHYLPREDNISOLONE PER 125 MG: Performed by: EMERGENCY MEDICINE

## 2025-02-15 PROCEDURE — 25010000002 DIPHENHYDRAMINE PER 50 MG: Performed by: EMERGENCY MEDICINE

## 2025-02-15 PROCEDURE — 63710000001 DIPHENHYDRAMINE PER 50 MG

## 2025-02-15 PROCEDURE — 93010 ELECTROCARDIOGRAM REPORT: CPT | Performed by: INTERNAL MEDICINE

## 2025-02-15 PROCEDURE — 99291 CRITICAL CARE FIRST HOUR: CPT

## 2025-02-15 PROCEDURE — 80053 COMPREHEN METABOLIC PANEL: CPT | Performed by: EMERGENCY MEDICINE

## 2025-02-15 RX ORDER — LEVOCETIRIZINE DIHYDROCHLORIDE 5 MG/1
5 TABLET, FILM COATED ORAL
COMMUNITY

## 2025-02-15 RX ORDER — ONDANSETRON 4 MG/1
4 TABLET, ORALLY DISINTEGRATING ORAL EVERY 8 HOURS PRN
Status: DISCONTINUED | OUTPATIENT
Start: 2025-02-15 | End: 2025-02-16 | Stop reason: HOSPADM

## 2025-02-15 RX ORDER — MONTELUKAST SODIUM 10 MG/1
10 TABLET ORAL NIGHTLY
Status: DISCONTINUED | OUTPATIENT
Start: 2025-02-15 | End: 2025-02-16 | Stop reason: HOSPADM

## 2025-02-15 RX ORDER — POTASSIUM CHLORIDE 1500 MG/1
40 TABLET, EXTENDED RELEASE ORAL EVERY 4 HOURS
Status: COMPLETED | OUTPATIENT
Start: 2025-02-15 | End: 2025-02-15

## 2025-02-15 RX ORDER — DIPHENHYDRAMINE HYDROCHLORIDE 50 MG/ML
25 INJECTION INTRAMUSCULAR; INTRAVENOUS EVERY 6 HOURS PRN
Status: DISCONTINUED | OUTPATIENT
Start: 2025-02-15 | End: 2025-02-15

## 2025-02-15 RX ORDER — SODIUM CHLORIDE 0.9 % (FLUSH) 0.9 %
10 SYRINGE (ML) INJECTION AS NEEDED
Status: DISCONTINUED | OUTPATIENT
Start: 2025-02-15 | End: 2025-02-16 | Stop reason: HOSPADM

## 2025-02-15 RX ORDER — LOSARTAN POTASSIUM 100 MG/1
100 TABLET ORAL
Status: DISCONTINUED | OUTPATIENT
Start: 2025-02-16 | End: 2025-02-16 | Stop reason: HOSPADM

## 2025-02-15 RX ORDER — SODIUM CHLORIDE 9 MG/ML
40 INJECTION, SOLUTION INTRAVENOUS AS NEEDED
Status: DISCONTINUED | OUTPATIENT
Start: 2025-02-15 | End: 2025-02-16 | Stop reason: HOSPADM

## 2025-02-15 RX ORDER — ALBUTEROL SULFATE 0.83 MG/ML
2.5 SOLUTION RESPIRATORY (INHALATION) EVERY 6 HOURS PRN
Status: DISCONTINUED | OUTPATIENT
Start: 2025-02-15 | End: 2025-02-16 | Stop reason: HOSPADM

## 2025-02-15 RX ORDER — BISACODYL 5 MG/1
5 TABLET, DELAYED RELEASE ORAL DAILY PRN
Status: DISCONTINUED | OUTPATIENT
Start: 2025-02-15 | End: 2025-02-16 | Stop reason: HOSPADM

## 2025-02-15 RX ORDER — CETIRIZINE HYDROCHLORIDE 10 MG/1
10 TABLET ORAL DAILY
Status: DISCONTINUED | OUTPATIENT
Start: 2025-02-15 | End: 2025-02-16 | Stop reason: HOSPADM

## 2025-02-15 RX ORDER — AZELASTINE 1 MG/ML
2 SPRAY, METERED NASAL 2 TIMES DAILY
Status: DISCONTINUED | OUTPATIENT
Start: 2025-02-15 | End: 2025-02-16 | Stop reason: HOSPADM

## 2025-02-15 RX ORDER — MULTIPLE VITAMINS W/ MINERALS TAB 9MG-400MCG
1 TAB ORAL DAILY
Status: DISCONTINUED | OUTPATIENT
Start: 2025-02-16 | End: 2025-02-16 | Stop reason: HOSPADM

## 2025-02-15 RX ORDER — SODIUM CHLORIDE 9 MG/ML
100 INJECTION, SOLUTION INTRAVENOUS CONTINUOUS
Status: ACTIVE | OUTPATIENT
Start: 2025-02-15 | End: 2025-02-15

## 2025-02-15 RX ORDER — DIPHENHYDRAMINE HYDROCHLORIDE 50 MG/ML
25 INJECTION INTRAMUSCULAR; INTRAVENOUS ONCE
Status: COMPLETED | OUTPATIENT
Start: 2025-02-15 | End: 2025-02-15

## 2025-02-15 RX ORDER — FAMOTIDINE 10 MG/ML
20 INJECTION, SOLUTION INTRAVENOUS ONCE
Status: COMPLETED | OUTPATIENT
Start: 2025-02-15 | End: 2025-02-15

## 2025-02-15 RX ORDER — POLYETHYLENE GLYCOL 3350 17 G/17G
17 POWDER, FOR SOLUTION ORAL DAILY PRN
Status: DISCONTINUED | OUTPATIENT
Start: 2025-02-15 | End: 2025-02-16 | Stop reason: HOSPADM

## 2025-02-15 RX ORDER — EPINEPHRINE 0.3 MG/.3ML
0.3 INJECTION SUBCUTANEOUS ONCE
Qty: 1 EACH | Refills: 99 | Status: SHIPPED | OUTPATIENT
Start: 2025-02-15 | End: 2025-02-15

## 2025-02-15 RX ORDER — PANTOPRAZOLE SODIUM 40 MG/1
40 TABLET, DELAYED RELEASE ORAL
Status: DISCONTINUED | OUTPATIENT
Start: 2025-02-16 | End: 2025-02-16 | Stop reason: HOSPADM

## 2025-02-15 RX ORDER — HYDROCHLOROTHIAZIDE 25 MG/1
25 TABLET ORAL
Status: DISCONTINUED | OUTPATIENT
Start: 2025-02-15 | End: 2025-02-16 | Stop reason: HOSPADM

## 2025-02-15 RX ORDER — SODIUM CHLORIDE 0.9 % (FLUSH) 0.9 %
10 SYRINGE (ML) INJECTION EVERY 12 HOURS SCHEDULED
Status: DISCONTINUED | OUTPATIENT
Start: 2025-02-15 | End: 2025-02-16 | Stop reason: HOSPADM

## 2025-02-15 RX ORDER — DIPHENHYDRAMINE HCL 25 MG
25 CAPSULE ORAL EVERY 6 HOURS PRN
Status: DISCONTINUED | OUTPATIENT
Start: 2025-02-15 | End: 2025-02-16 | Stop reason: HOSPADM

## 2025-02-15 RX ORDER — METHYLPREDNISOLONE SODIUM SUCCINATE 125 MG/2ML
125 INJECTION, POWDER, LYOPHILIZED, FOR SOLUTION INTRAMUSCULAR; INTRAVENOUS EVERY 12 HOURS
Status: DISCONTINUED | OUTPATIENT
Start: 2025-02-15 | End: 2025-02-16 | Stop reason: HOSPADM

## 2025-02-15 RX ORDER — NITROGLYCERIN 0.4 MG/1
0.4 TABLET SUBLINGUAL
Status: DISCONTINUED | OUTPATIENT
Start: 2025-02-15 | End: 2025-02-16 | Stop reason: HOSPADM

## 2025-02-15 RX ORDER — AMOXICILLIN 250 MG
2 CAPSULE ORAL 2 TIMES DAILY PRN
Status: DISCONTINUED | OUTPATIENT
Start: 2025-02-15 | End: 2025-02-16 | Stop reason: HOSPADM

## 2025-02-15 RX ORDER — METHYLPREDNISOLONE SODIUM SUCCINATE 125 MG/2ML
125 INJECTION, POWDER, LYOPHILIZED, FOR SOLUTION INTRAMUSCULAR; INTRAVENOUS ONCE
Status: COMPLETED | OUTPATIENT
Start: 2025-02-15 | End: 2025-02-15

## 2025-02-15 RX ORDER — BISACODYL 10 MG
10 SUPPOSITORY, RECTAL RECTAL DAILY PRN
Status: DISCONTINUED | OUTPATIENT
Start: 2025-02-15 | End: 2025-02-16 | Stop reason: HOSPADM

## 2025-02-15 RX ADMIN — SODIUM CHLORIDE 100 ML/HR: 9 INJECTION, SOLUTION INTRAVENOUS at 13:47

## 2025-02-15 RX ADMIN — POTASSIUM CHLORIDE 40 MEQ: 1500 TABLET, EXTENDED RELEASE ORAL at 14:45

## 2025-02-15 RX ADMIN — DIPHENHYDRAMINE HYDROCHLORIDE 25 MG: 25 CAPSULE ORAL at 22:12

## 2025-02-15 RX ADMIN — POTASSIUM CHLORIDE 40 MEQ: 1500 TABLET, EXTENDED RELEASE ORAL at 19:11

## 2025-02-15 RX ADMIN — POTASSIUM CHLORIDE 40 MEQ: 1500 TABLET, EXTENDED RELEASE ORAL at 22:12

## 2025-02-15 RX ADMIN — METHYLPREDNISOLONE SODIUM SUCCINATE 125 MG: 125 INJECTION, POWDER, FOR SOLUTION INTRAMUSCULAR; INTRAVENOUS at 20:38

## 2025-02-15 RX ADMIN — METHYLPREDNISOLONE SODIUM SUCCINATE 125 MG: 125 INJECTION, POWDER, FOR SOLUTION INTRAMUSCULAR; INTRAVENOUS at 11:12

## 2025-02-15 RX ADMIN — MONTELUKAST 10 MG: 10 TABLET, FILM COATED ORAL at 20:38

## 2025-02-15 RX ADMIN — FAMOTIDINE 20 MG: 10 INJECTION INTRAVENOUS at 11:17

## 2025-02-15 RX ADMIN — Medication 10 ML: at 21:04

## 2025-02-15 RX ADMIN — DIPHENHYDRAMINE HYDROCHLORIDE 25 MG: 50 INJECTION, SOLUTION INTRAMUSCULAR; INTRAVENOUS at 11:17

## 2025-02-15 RX ADMIN — AZELASTINE HYDROCHLORIDE 2 SPRAY: 137 SPRAY, METERED NASAL at 20:38

## 2025-02-15 RX ADMIN — Medication 10 ML: at 13:48

## 2025-02-15 NOTE — H&P
[facility]   HISTORY AND PHYSICAL    Patient Name: Leann Moctezuma  : 1970  MRN: [id number]  Primary Care Physician:  Jess Ricks MD  Date of admission: 2/15/2025    Subjective   Subjective     Chief Complaint:   Chief Complaint   Patient presents with    Allergic Reaction         HPI:    Leann Moctezuma is a pleasant afebrile 54 y.o.  female with a past medical history of hypertension, hyperlipidemia, gastroesophageal reflux disease and asthma.     She presents to the emergency department via EMS today with complaint of anaphylaxis.  She has been admitted to the ED observation unit for further testing and evaluation.    Patient reports around 10 AM today after taking an Augmentin she had found leftover in her cabinet that she developed itchy skin to her neck.  She states is progressed to swelling of her lips, chest pressure, shortness of breath and difficulty swallowing.  She states this is never happened to her previously.    She drove herself to urgent care where she was given a dose of intramuscular epinephrine and oral Benadryl while EMS was called.  Patient reports that her symptoms were persistent with EMS and she was given an additional dose of intramuscular epinephrine.  After arriving in the ED here she was given IV Benadryl, Solu-Medrol and Pepcid.  During my bedside evaluation patient endorses that her symptoms have resolved but given 2 doses of epinephrine will need to monitor patient overnight.      Review of Systems   All systems were reviewed and negative except for: What is mentioned above    Personal History     Past Medical History:   Diagnosis Date    ADHD (attention deficit hyperactivity disorder)     Attention deficit disorder (ADD) in adult     Encounter for screening for malignant neoplasm of colon 2024    Hyperlipidemia     Hypertension     Influenza        Past Surgical History:   Procedure Laterality Date    ADENOIDECTOMY      SINUS SURGERY      SUBTOTAL  HYSTERECTOMY  11/2009    TONSILLECTOMY         Family History: family history includes COPD in her maternal uncle; Cancer in her mother, paternal grandfather, and paternal grandmother; Deep vein thrombosis in her mother; Diabetes in her mother. Otherwise pertinent FHx was reviewed and not pertinent to current issue.    Social History:  reports that she quit smoking about 17 months ago. Her smoking use included cigarettes. She has been exposed to tobacco smoke. She has never used smokeless tobacco. She reports current alcohol use. She reports that she does not use drugs.    Home Medications:  albuterol sulfate HFA, amphetamine-dextroamphetamine, azelastine, clindamycin, ezetimibe, montelukast, olmesartan-hydrochlorothiazide, omeprazole, ondansetron ODT, topiramate, triamcinolone, and varenicline    Allergies:  Allergies   Allergen Reactions    Augmentin [Amoxicillin-Pot Clavulanate] Anaphylaxis    Codeine Itching and Nausea Only       Objective   Objective     Vitals:   Temp:  [98.1 °F (36.7 °C)] 98.1 °F (36.7 °C)  Heart Rate:  [] 83  Resp:  [18-22] 18  BP: ()/(64-81) 136/81  Physical Exam    Constitutional: Awake, alert   Eyes: PERRLA, sclerae anicteric, no conjunctival injection   HENT: NCAT, mucous membranes moist   Neck: Supple, no thyromegaly, no lymphadenopathy, trachea midline   Respiratory: Clear to auscultation bilaterally, nonlabored respirations    Cardiovascular: RRR, no murmurs, rubs, or gallops, palpable pedal pulses bilaterally   Gastrointestinal: Positive bowel sounds, soft, nontender, nondistended   Musculoskeletal: No bilateral ankle edema, no clubbing or cyanosis to extremities   Psychiatric: Appropriate affect, cooperative   Neurologic: Oriented x 3, strength symmetric in all extremities, Cranial Nerves grossly intact to confrontation, speech clear   Skin: No rashes     Result Review    Result Review:  I have personally reviewed the results from the time of this admission to 2/15/2025  12:10 EST and agree with these findings:  [x]  Laboratory list / accordion  []  Microbiology  []  Radiology  [x]  EKG/Telemetry   []  Cardiology/Vascular   []  Pathology  []  Old records  []  Other:  Most notable findings include: Serum potassium 3.0, blood glucose 161, EKG 2/15/25 11:12 sinus rhythm rate of 77, artifact in inferior lead but no evidence of acute ischemia appreciated      Assessment & Plan   The 10-year ASCVD risk score (Marilou BAJWA, et al., 2019) is: 2.9%    Values used to calculate the score:      Age: 54 years      Sex: Female      Is Non- : No      Diabetic: No      Tobacco smoker: No      Systolic Blood Pressure: 136 mmHg      Is BP treated: Yes      HDL Cholesterol: 50 mg/dL      Total Cholesterol: 206 mg/dL    Assessment / Plan     Brief Patient Summary:  Leann Moctezuma is a 54 y.o. female who is being evaluated for logic reaction to Augmentin, patient cautioned to avoid this medication again in the future.    Active Hospital Problems:  Active Hospital Problems    Diagnosis     **Anaphylaxis      Plan:     Anaphylaxis  Continuous telemetry  Patient given 2 doses of IM epi today, EpiPen prescribed to patient's outpatient pharmacy  Patient received Benadryl, Solu-Medrol and Pepcid in ED, will monitor for rebound symptoms    Hypokalemia  Serum potassium 3.0, trend with a.m. labs  Electrolyte replacement protocol initiated  Check magnesium level    Hypertension  Vital signs every 4 hours  Continue home medications    VTE Prophylaxis:  Mechanical VTE prophylaxis orders are present.        CODE STATUS:    Level Of Support Discussed With: Patient  Code Status (Patient has no pulse and is not breathing): CPR (Attempt to Resuscitate)  Medical Interventions (Patient has pulse or is breathing): Full Support    Admission Status:  I believe this patient meets observation status.    Electronically signed by MATHIEU Hernandez, 02/15/25, 12:10 PM EST.      80 minutes has been spent by  [facility] providers in the care of this patient while under observation status      I have worn appropriate PPE during this patient encounter, sanitized my hands both with entering and exiting patient's room.

## 2025-02-15 NOTE — ED PROVIDER NOTES
EMERGENCY DEPARTMENT ENCOUNTER  Room Number:  25/25  PCP: Jess Ricks MD  Independent Historians: Patient and EMS      HPI:  Chief Complaint: had concerns including Allergic Reaction.     A complete HPI/ROS/PMH/PSH/SH/FH are unobtainable due to: None    Chronic or social conditions impacting patient care (Social Determinants of Health): None      Context: Leann Moctezuma is a 54 y.o. female with a medical history of hypertension, hyperlipidemia, kidney injury who presents to the ED c/o acute allergic reaction.  The patient reports that she has had some sinus issues recently.  She had a leftover Augmentin prescription and so she took a single dose of Augmentin today.  She reports she developed hives, itching, shortness of breath, chest tightness.  She went to the urgent care center and they gave her Benadryl and a epi injection.  She reports EMS was called.  They gave her another epinephrine injection en route to the hospital.  She reports her chest tightness has resolved.  She reports her shortness of breath has resolved.  She still reports some itchy rash.  Has never had an allergic reaction in the past.  She has never required epi in the past.  She has had Augmentin in the past.      Review of prior external notes (non-ED) -and- Review of prior external test results outside of this encounter:  Laboratory evaluation 1/13/2025 shows normal CMP    Prescription drug monitoring program review:         PAST MEDICAL HISTORY  Active Ambulatory Problems     Diagnosis Date Noted    Mild intermittent asthma without complication 05/18/2020    Essential hypertension 05/18/2020    Personal history of tobacco use 07/17/2020    Gastroesophageal reflux disease without esophagitis 07/17/2020    History of COVID-19 01/03/2022    Hyperlipidemia 01/21/2022    History of colon polyps 01/21/2022    Esophageal stricture 01/21/2022    Hot flushes, perimenopausal 10/21/2022    Chronic sinusitis 10/21/2022    Chronic fatigue 10/27/2023     Hypercalcemia 10/27/2023    MEGA (acute kidney injury) 10/27/2023    Chronic allergic rhinitis 05/14/2024    Attention deficit hyperactivity disorder (ADHD), combined type 05/14/2024    Vitamin D deficiency 05/14/2024    Obesity, Class II, BMI 35-39.9 10/16/2024    Medically complex patient 10/16/2024    Pain of left thumb 10/17/2024    Encounter for screening for malignant neoplasm of colon 11/12/2024    Bilateral hand pain 01/17/2025    Positive BRAYAN (antinuclear antibody) 01/17/2025    Abnormal TSH 01/17/2025     Resolved Ambulatory Problems     Diagnosis Date Noted    Other specified hypothyroidism 05/18/2020    Allergies 05/18/2020    Mixed hyperlipidemia 05/18/2020    Well woman exam with routine gynecological exam 07/17/2020    Encounter for screening mammogram for breast cancer 07/17/2020    Acute recurrent maxillary sinusitis 02/07/2023    Chigger bites 06/19/2023    History of hypothyroidism 02/13/2024    Class II obesity 05/14/2024    BMI 34.0-34.9,adult 07/09/2024     Past Medical History:   Diagnosis Date    ADHD (attention deficit hyperactivity disorder)     Attention deficit disorder (ADD) in adult     Hypertension     Influenza          PAST SURGICAL HISTORY  Past Surgical History:   Procedure Laterality Date    ADENOIDECTOMY      SINUS SURGERY  2000    SUBTOTAL HYSTERECTOMY  11/2009    TONSILLECTOMY           FAMILY HISTORY  Family History   Problem Relation Age of Onset    Deep vein thrombosis Mother     Cancer Mother     Diabetes Mother     Cancer Paternal Grandfather         lung cancer    Cancer Paternal Grandmother         lung cancer    COPD Maternal Uncle         Great Uncle    Breast cancer Neg Hx     Ovarian cancer Neg Hx     Uterine cancer Neg Hx     Colon cancer Neg Hx          SOCIAL HISTORY  Social History     Socioeconomic History    Marital status:    Tobacco Use    Smoking status: Former     Current packs/day: 0.00     Types: Cigarettes     Quit date: 9/3/2023     Years  since quittin.4     Passive exposure: Past    Smokeless tobacco: Never    Tobacco comments:     6 years   Vaping Use    Vaping status: Never Used   Substance and Sexual Activity    Alcohol use: Yes     Comment: occ    Drug use: Never    Sexual activity: Not Currently     Partners: Male     Birth control/protection: Hysterectomy         ALLERGIES  Augmentin [amoxicillin-pot clavulanate] and Codeine      REVIEW OF SYSTEMS  Review of Systems  Included in HPI  All systems reviewed and negative except for those discussed in HPI.      PHYSICAL EXAM    I have reviewed the triage vital signs and nursing notes.    ED Triage Vitals [02/15/25 1051]   Temp Heart Rate Resp BP SpO2   98.1 °F (36.7 °C) 64 18 110/74 96 %      Temp src Heart Rate Source Patient Position BP Location FiO2 (%)   Tympanic -- -- -- --       Physical Exam  GENERAL: Awake, alert, no acute distress  SKIN: Warm, dry, erythematous  HENT: Normocephalic, atraumatic  EYES: no scleral icterus  CV: regular rhythm, regular rate  RESPIRATORY: normal effort, lungs clear without wheezes  ABDOMEN: soft, nontender, nondistended  MUSCULOSKELETAL: no deformity  NEURO: alert, moves all extremities, follows commands            LAB RESULTS  Recent Results (from the past 24 hours)   CBC Auto Differential    Collection Time: 02/15/25 11:09 AM    Specimen: Blood   Result Value Ref Range    WBC 13.74 (H) 3.40 - 10.80 10*3/mm3    RBC 4.96 3.77 - 5.28 10*6/mm3    Hemoglobin 14.1 12.0 - 15.9 g/dL    Hematocrit 43.2 34.0 - 46.6 %    MCV 87.1 79.0 - 97.0 fL    MCH 28.4 26.6 - 33.0 pg    MCHC 32.6 31.5 - 35.7 g/dL    RDW 12.4 12.3 - 15.4 %    RDW-SD 39.5 37.0 - 54.0 fl    MPV 10.4 6.0 - 12.0 fL    Platelets 424 140 - 450 10*3/mm3    Neutrophil % 44.9 42.7 - 76.0 %    Lymphocyte % 48.0 (H) 19.6 - 45.3 %    Monocyte % 4.1 (L) 5.0 - 12.0 %    Eosinophil % 2.2 0.3 - 6.2 %    Basophil % 0.4 0.0 - 1.5 %    Immature Grans % 0.4 0.0 - 0.5 %    Neutrophils, Absolute 6.17 1.70 - 7.00  10*3/mm3    Lymphocytes, Absolute 6.60 (H) 0.70 - 3.10 10*3/mm3    Monocytes, Absolute 0.57 0.10 - 0.90 10*3/mm3    Eosinophils, Absolute 0.30 0.00 - 0.40 10*3/mm3    Basophils, Absolute 0.05 0.00 - 0.20 10*3/mm3    Immature Grans, Absolute 0.05 0.00 - 0.05 10*3/mm3    nRBC 0.0 0.0 - 0.2 /100 WBC   ECG 12 Lead Chest Pain    Collection Time: 02/15/25 11:12 AM   Result Value Ref Range    QT Interval 416 ms    QTC Interval 470 ms         RADIOLOGY  No Radiology Exams Resulted Within Past 24 Hours      MEDICATIONS GIVEN IN ER  Medications   sodium chloride 0.9 % flush 10 mL (has no administration in time range)   nitroglycerin (NITROSTAT) SL tablet 0.4 mg (has no administration in time range)   sodium chloride 0.9 % flush 10 mL (has no administration in time range)   sodium chloride 0.9 % flush 10 mL (has no administration in time range)   sodium chloride 0.9 % infusion 40 mL (has no administration in time range)   sennosides-docusate (PERICOLACE) 8.6-50 MG per tablet 2 tablet (has no administration in time range)     And   polyethylene glycol (MIRALAX) packet 17 g (has no administration in time range)     And   bisacodyl (DULCOLAX) EC tablet 5 mg (has no administration in time range)     And   bisacodyl (DULCOLAX) suppository 10 mg (has no administration in time range)   methylPREDNISolone sodium succinate (SOLU-Medrol) injection 125 mg (125 mg Intravenous Given 2/15/25 1112)   famotidine (PEPCID) injection 20 mg (20 mg Intravenous Given 2/15/25 1117)   diphenhydrAMINE (BENADRYL) injection 25 mg (25 mg Intravenous Given 2/15/25 1117)         ORDERS PLACED DURING THIS VISIT:  Orders Placed This Encounter   Procedures    Comprehensive Metabolic Panel    CBC Auto Differential    CBC (No Diff)    Basic Metabolic Panel    Diet: Regular/House; Fluid Consistency: Thin (IDDSI 0)    Continuous Pulse Oximetry    Telemetry - Place Orders & Notify Provider of Results When Patient Experiences Acute Chest Pain, Dysrhythmia or  Respiratory Distress    May Be Off Telemetry for Tests    Vital Signs    Intake & Output    Weigh Patient    Oral Care    Saline Lock & Maintain IV Access    Code Status and Medical Interventions: CPR (Attempt to Resuscitate); Full Support    ECG 12 Lead Chest Pain    Insert Peripheral IV    Insert Peripheral IV    Initiate ED Observation Status    CBC & Differential         OUTPATIENT MEDICATION MANAGEMENT:  Current Facility-Administered Medications Ordered in Epic   Medication Dose Route Frequency Provider Last Rate Last Admin    sennosides-docusate (PERICOLACE) 8.6-50 MG per tablet 2 tablet  2 tablet Oral BID PRN Riya Rodriguez APRN        And    polyethylene glycol (MIRALAX) packet 17 g  17 g Oral Daily PRN Riya Rodriguez APRN        And    bisacodyl (DULCOLAX) EC tablet 5 mg  5 mg Oral Daily PRN Riya Rodriguez APRN        And    bisacodyl (DULCOLAX) suppository 10 mg  10 mg Rectal Daily PRN Riya Rodriguez APRN        nitroglycerin (NITROSTAT) SL tablet 0.4 mg  0.4 mg Sublingual Q5 Min PRN Riya Rodriguez APRN        sodium chloride 0.9 % flush 10 mL  10 mL Intravenous PRN Mark Galarza MD        sodium chloride 0.9 % flush 10 mL  10 mL Intravenous Q12H Riya Rodriguez APRN        sodium chloride 0.9 % flush 10 mL  10 mL Intravenous PRN Riya Rodriguez APRN        sodium chloride 0.9 % infusion 40 mL  40 mL Intravenous PRN Riya Rodriguez APRN         Current Outpatient Medications Ordered in Epic   Medication Sig Dispense Refill    albuterol sulfate  (90 Base) MCG/ACT inhaler Inhale 2 puffs 4 (Four) Times a Day. 18 g 1    amphetamine-dextroamphetamine (ADDERALL) 20 MG tablet Take 1 tablet by mouth 2 (Two) Times a Day.      APO-Varenicline 1 MG tablet TAKE 1 (ONE) TABLET BY MOUTH TWICE DAILY 60 tablet 3    azelastine (ASTELIN) 0.1 % nasal spray 2 sprays into the nostril(s) as directed by provider 2 (Two) Times a Day. Use in each nostril as directed 1 each 3    clindamycin (CLEOCIN T) 1 % external solution APPLY TO  AFFECTED AREA(S) ON SCALP ONCE DAILY AS NEEDED      ezetimibe (Zetia) 10 MG tablet Take 1 tablet by mouth Daily. 30 tablet 5    montelukast (SINGULAIR) 10 MG tablet TAKE 1 (ONE) TABLET BY MOUTH NIGHTLY 90 tablet 1    olmesartan-hydrochlorothiazide (BENICAR HCT) 40-25 MG per tablet Take 1 tablet by mouth Daily. 90 tablet 1    omeprazole (priLOSEC) 40 MG capsule TAKE 1 (ONE) CAPSULE BY MOUTH TWICE DAILY 60 capsule 12    ondansetron ODT (ZOFRAN-ODT) 8 MG disintegrating tablet PLACE 1 (ONE) TABLET ON TONGUE 3 TIMES DAILY 90 tablet 5    topiramate (Topamax) 50 MG tablet 1/2 tablet p.o. daily x 7 days then may increase to 1 whole tablet p.o. daily x 7 days then may increase to 1 tablet twice a day 60 tablet 3    triamcinolone (KENALOG) 0.5 % ointment Apply 1 Application topically to the appropriate area as directed 2 (Two) Times a Day. 15 g 0         PROCEDURES  Procedures      Critical care provider statement:    Critical care time (minutes): 35.   Critical care time was exclusive of:  Separately billable procedures and treating other patients   Critical care was necessary to treat or prevent imminent or life-threatening deterioration of the following conditions:  Circulatory Failure   Critical care was time spent personally by me on the following activities:  Development of treatment plan with patient or surrogate, discussions with consultants, evaluation of patient's response to treatment, examination of patient, obtaining history from patient or surrogate, ordering and performing treatments and interventions, ordering and review of laboratory studies, ordering and review of radiographic studies, pulse oximetry, re-evaluation of patient's condition and review of old charts. Critical Care indicators:        PROGRESS, DATA ANALYSIS, CONSULTS, AND MEDICAL DECISION MAKING  All labs have been independently interpreted by me.  All radiology studies have been reviewed by me. All EKG's have been independently viewed and  interpreted by me.  Discussion below represents my analysis of pertinent findings related to patient's condition, differential diagnosis, treatment plan and final disposition.    Differential diagnosis includes but is not limited to viral syndrome, pneumonia, allergic reaction, anaphylaxis, sepsis, acute coronary syndrome, acute aortic syndrome.    Clinical Scores:                                       ED Course as of 02/15/25 1210   Sat Feb 15, 2025   1118 EKG PROCEDURE    EKG time: 1112  Rhythm/Rate: Sinus, rate 77  P waves and HI: Normal P, normal HI  QRS, axis: Narrow QRS, normal axis  ST and T waves: No acute    Independently Interpreted by me  Not significantly changed compared to prior at the urgent care center   [TR]   1207 I reviewed the workup and findings with the patient at the bedside.  Answered all questions.  She is feeling significantly better.  Her redness has mostly resolved.  Her chest tightness has resolved.  Her shortness of breath has resolved.  Given that she required 2 doses of epinephrine and she has never had an allergic reaction in the past, plan admission.  She is agreeable.  Discussing with Riya Rodriguez with the observation unit.  She accepts for admission. [TR]      ED Course User Index  [TR] Mark Galarza MD             AS OF 12:10 EST VITALS:    BP - 136/81  HR - 83  TEMP - 98.1 °F (36.7 °C) (Tympanic)  O2 SATS - 99%    COMPLEXITY OF CARE  The patient requires admission.      DIAGNOSIS  Final diagnoses:   Anaphylaxis, initial encounter         DISPOSITION  ED Disposition       ED Disposition   Decision to Admit    Condition   --    Comment   --                Please note that portions of this document were completed with a voice recognition program.    Note Disclaimer: At [facility], we believe that sharing information builds trust and better relationships. You are receiving this note because you recently visited [facility]. It is possible you will see health information  before a provider has talked with you about it. This kind of information can be easy to misunderstand. To help you fully understand what it means for your health, we urge you to discuss this note with your provider.         Mark Galarza MD  02/15/25 [id number]

## 2025-02-15 NOTE — ED NOTES
Pt via [facility] from [facility] with c/o allergic reaction to amoxillicin; c/o angioedema, SOA, hives     EMS gave benadryl and epi en route

## 2025-02-15 NOTE — ED NOTES
Nursing report ED to floor  Leann Moctezuma  54 y.o.  female    HPI :  HPI  Stated Reason for Visit: see note  History Obtained From: EMS    Chief Complaint  Chief Complaint   Patient presents with    Allergic Reaction       Admitting doctor:   Mitesh Trejo MD    Admitting diagnosis:   The encounter diagnosis was Anaphylaxis, initial encounter.    Code status:   Current Code Status       Date Active Code Status Order ID Comments User Context       2/15/2025 1209 CPR (Attempt to Resuscitate) [id number]  Riya Rodriguez APRN ED        Question Answer    Code Status (Patient has no pulse and is not breathing) CPR (Attempt to Resuscitate)    Medical Interventions (Patient has pulse or is breathing) Full Support    Level Of Support Discussed With Patient                    Allergies:   Augmentin [amoxicillin-pot clavulanate] and Codeine    Isolation:   No active isolations    Intake and Output  No intake or output data in the 24 hours ending 02/15/25 1231    Weight:   There were no vitals filed for this visit.    Most recent vitals:   Vitals:    02/15/25 1051 02/15/25 1114 02/15/25 1131 02/15/25 1201   BP: 110/74 136/81 118/98 141/84   Pulse: 64 83 92 93   Resp: 18      Temp: 98.1 °F (36.7 °C)      TempSrc: Tympanic      SpO2: 96% 99% 94% 96%       Active LDAs/IV Access:   Lines, Drains & Airways       Active LDAs       Name Placement date Placement time Site Days    Peripheral IV 02/15/25 1110 Posterior;Right Hand 02/15/25  1110  Hand  less than 1                    Labs (abnormal labs have a star):   Labs Reviewed   CBC WITH AUTO DIFFERENTIAL - Abnormal; Notable for the following components:       Result Value    WBC 13.74 (*)     Lymphocyte % 48.0 (*)     Monocyte % 4.1 (*)     Lymphocytes, Absolute 6.60 (*)     All other components within normal limits   COMPREHENSIVE METABOLIC PANEL   CBC AND DIFFERENTIAL    Narrative:     The following orders were created for panel order CBC & Differential.  Procedure                                Abnormality         Status                     ---------                               -----------         ------                     CBC Auto Differential[[id number]]        Abnormal            Final result                 Please view results for these tests on the individual orders.       EKG:   ECG 12 Lead Chest Pain   Preliminary Result   HEART RATE=77  bpm   RR Wzsuhlic=755  ms   NV Ymsmpfsl=872  ms   P Horizontal Axis=26  deg   P Front Axis=19  deg   QRSD Ckvxpahy=264  ms   QT Xysmvysq=386  ms   VIzC=461  ms   QRS Axis=12  deg   T Wave Axis=16  deg   - OTHERWISE NORMAL ECG -   Sinus rhythm   Low voltage, precordial leads   Date and Time of Study:2025-02-15 11:12:41          Meds given in ED:   Medications   sodium chloride 0.9 % flush 10 mL (has no administration in time range)   nitroglycerin (NITROSTAT) SL tablet 0.4 mg (has no administration in time range)   sodium chloride 0.9 % flush 10 mL (has no administration in time range)   sodium chloride 0.9 % flush 10 mL (has no administration in time range)   sodium chloride 0.9 % infusion 40 mL (has no administration in time range)   sennosides-docusate (PERICOLACE) 8.6-50 MG per tablet 2 tablet (has no administration in time range)     And   polyethylene glycol (MIRALAX) packet 17 g (has no administration in time range)     And   bisacodyl (DULCOLAX) EC tablet 5 mg (has no administration in time range)     And   bisacodyl (DULCOLAX) suppository 10 mg (has no administration in time range)   sodium chloride 0.9 % infusion (has no administration in time range)   methylPREDNISolone sodium succinate (SOLU-Medrol) injection 125 mg (125 mg Intravenous Given 2/15/25 1112)   famotidine (PEPCID) injection 20 mg (20 mg Intravenous Given 2/15/25 1117)   diphenhydrAMINE (BENADRYL) injection 25 mg (25 mg Intravenous Given 2/15/25 1117)       Imaging results:  No radiology results for the last day    Ambulatory status:   - ad anju    Social issues:   Social History      Socioeconomic History    Marital status:    Tobacco Use    Smoking status: Former     Current packs/day: 0.00     Types: Cigarettes     Quit date: 9/3/2023     Years since quittin.4     Passive exposure: Past    Smokeless tobacco: Never    Tobacco comments:     6 years   Vaping Use    Vaping status: Never Used   Substance and Sexual Activity    Alcohol use: Yes     Comment: occ    Drug use: Never    Sexual activity: Not Currently     Partners: Male     Birth control/protection: Hysterectomy       Peripheral Neurovascular  Peripheral Neurovascular (Adult)  Peripheral Neurovascular WDL: WDL    Neuro Cognitive  Neuro Cognitive (Adult)  Cognitive/Neuro/Behavioral WDL: WDL    Learning  Learning Assessment  Learning Readiness and Ability: no barriers identified  Education Provided  Person Taught: patient    Respiratory  Respiratory WDL  Respiratory WDL: .WDL except, rhythm/pattern  Rhythm/Pattern, Respiratory: shortness of breath    Abdominal Pain       Pain Assessments  Pain (Adult)  (0-10) Pain Rating: Rest: 0  (0-10) Pain Rating: Activity: 0    NIH Stroke Scale       Nighat Aldana RN  02/15/25 12:31 EST

## 2025-02-15 NOTE — PLAN OF CARE
Goal Outcome Evaluation:  Plan of Care Reviewed With: patient        Progress: improving  Outcome Evaluation: Patient is alert and oriented times 4. Vital signs are stable. On room air. Patient receiving IV fluids. Patient admitted to observation for anaphylaxis. Up ad anju with activity. Potassium is being replaced per protocol. Questions and concerns addressed. Plan of care ongoing.

## 2025-02-16 ENCOUNTER — READMISSION MANAGEMENT (OUTPATIENT)
Dept: CALL CENTER | Facility: HOSPITAL | Age: 55
End: 2025-02-16
Payer: COMMERCIAL

## 2025-02-16 VITALS
HEIGHT: 65 IN | SYSTOLIC BLOOD PRESSURE: 116 MMHG | TEMPERATURE: 97.9 F | RESPIRATION RATE: 18 BRPM | HEART RATE: 98 BPM | WEIGHT: 209.6 LBS | OXYGEN SATURATION: 98 % | DIASTOLIC BLOOD PRESSURE: 74 MMHG | BODY MASS INDEX: 34.92 KG/M2

## 2025-02-16 LAB
ANION GAP SERPL CALCULATED.3IONS-SCNC: 11.3 MMOL/L (ref 5–15)
BUN SERPL-MCNC: 13 MG/DL (ref 6–20)
BUN/CREAT SERPL: 17.1 (ref 7–25)
CALCIUM SPEC-SCNC: 9.3 MG/DL (ref 8.6–10.5)
CHLORIDE SERPL-SCNC: 107 MMOL/L (ref 98–107)
CO2 SERPL-SCNC: 20.7 MMOL/L (ref 22–29)
CREAT SERPL-MCNC: 0.76 MG/DL (ref 0.57–1)
DEPRECATED RDW RBC AUTO: 40.7 FL (ref 37–54)
EGFRCR SERPLBLD CKD-EPI 2021: 93.8 ML/MIN/1.73
ERYTHROCYTE [DISTWIDTH] IN BLOOD BY AUTOMATED COUNT: 12.7 % (ref 12.3–15.4)
GLUCOSE SERPL-MCNC: 139 MG/DL (ref 65–99)
HCT VFR BLD AUTO: 37.5 % (ref 34–46.6)
HGB BLD-MCNC: 12.3 G/DL (ref 12–15.9)
MCH RBC QN AUTO: 28.7 PG (ref 26.6–33)
MCHC RBC AUTO-ENTMCNC: 32.8 G/DL (ref 31.5–35.7)
MCV RBC AUTO: 87.6 FL (ref 79–97)
PLATELET # BLD AUTO: 336 10*3/MM3 (ref 140–450)
PMV BLD AUTO: 10.4 FL (ref 6–12)
POTASSIUM SERPL-SCNC: 4 MMOL/L (ref 3.5–5.2)
RBC # BLD AUTO: 4.28 10*6/MM3 (ref 3.77–5.28)
SODIUM SERPL-SCNC: 139 MMOL/L (ref 136–145)
WBC NRBC COR # BLD AUTO: 25.9 10*3/MM3 (ref 3.4–10.8)

## 2025-02-16 PROCEDURE — 25010000002 METHYLPREDNISOLONE PER 125 MG: Performed by: NURSE PRACTITIONER

## 2025-02-16 PROCEDURE — 80048 BASIC METABOLIC PNL TOTAL CA: CPT | Performed by: NURSE PRACTITIONER

## 2025-02-16 PROCEDURE — G0378 HOSPITAL OBSERVATION PER HR: HCPCS

## 2025-02-16 PROCEDURE — 96376 TX/PRO/DX INJ SAME DRUG ADON: CPT

## 2025-02-16 PROCEDURE — 85027 COMPLETE CBC AUTOMATED: CPT | Performed by: NURSE PRACTITIONER

## 2025-02-16 RX ADMIN — Medication 10 ML: at 08:33

## 2025-02-16 RX ADMIN — METHYLPREDNISOLONE SODIUM SUCCINATE 125 MG: 125 INJECTION, POWDER, FOR SOLUTION INTRAMUSCULAR; INTRAVENOUS at 08:31

## 2025-02-16 RX ADMIN — Medication 1 TABLET: at 08:33

## 2025-02-16 RX ADMIN — LOSARTAN POTASSIUM 100 MG: 100 TABLET, FILM COATED ORAL at 08:30

## 2025-02-16 RX ADMIN — HYDROCHLOROTHIAZIDE 25 MG: 25 TABLET ORAL at 08:30

## 2025-02-16 NOTE — PROGRESS NOTES
GHADA ARANGO Attestation Note    I supervised care provided by the midlevel provider.    The AGUSTIN and I have discussed this patient's history, physical exam, and treatment plan. I have reviewed the documentation and personally had a face to face interaction with the patient  I affirm the documentation and agree with the treatment and plan. I provided a substantive portion of the care of this patient.  I personally performed the physical exam, in its entirety.  My personal findings are documented in below:    History:  Patient with history of hypertension, GERD, hyperlipidemia and asthma is admitted to observation unit for further monitoring and management of anaphylactic reaction.  She began having itching, rash, lip swelling, chest pressure, difficulty swallowing and shortness of breath after taking an Augmentin tablet and another over-the-counter medication for URI symptoms.  She drove to an urgent care center and had 1 dose of epinephrine.  Then she received a second dose of epinephrine by paramedics on her way to the hospital emergency room.  At this time she says she feels much better.  She is still having some tingling in her arms and legs.  However she denies difficulties breathing or difficulty swallowing.    Physical Exam:  General: No acute distress.  HENT: NCAT, moist mucous membranes.  No facial edema.  Normal vocal phonation.  No drooling or trismus  Eyes: no scleral icterus.  EOMI  Neck: Painless range of motion  CV: Pink warm and well-perfused throughout, normal pulses  Respiratory: No distress or increased work of breathing, no wheezes.  No stridor.  Abdomen: soft, no focal tenderness or rigidity  Musculoskeletal: no deformity.  Neuro: Alert, speech fluent and easily intelligible  Skin: warm, dry.  No obvious hives at this time.    Assessment and Plan:  Anaphylaxis: Clinically improved after 2 doses of epinephrine as well as antihistamines and Solu-Medrol.  Will continue with antihistamines and steroid  every 12 hours while monitoring for any recurrence of symptoms.    Hypokalemia: Incidentally noted on labs.  Will replace per protocol and recheck in the morning.    Hypertension: Continue home medications.  Monitor vital signs.

## 2025-02-16 NOTE — PLAN OF CARE
Goal Outcome Evaluation:            Pt admitted following an anphylaxis reaction from Augmentin. No signs of a reaction throughout the night. At shift change (1900) the patient was complaining of her feet and hands tingling but that resolved an hour later on its own. No complaints throughout the night.

## 2025-02-16 NOTE — DISCHARGE SUMMARY
ED OBSERVATION PROGRESS/DISCHARGE SUMMARY    Date of Admission: 2/15/2025   LOS: 0 days   PCP: Jess Ricks MD    Final Diagnosis: Anaphylaxis    Hospital Outcome:     Leann Moctezuma is a 54 y.o. female presents with anaphylaxis after taking a leftover dose of Augmentin prescribed to her from prior illness for new upper respiratory infection symptoms and shortly after developed itching to her face and neck and facial swelling.  She received IM epinephrine at the urgent care as well as via EMS.  In the ED she received IV Benadryl, Solu-Medrol, and Pepcid.    02/16/25  No facial swelling, dysphagia dyspnea overnight.  Afebrile, no cough.  Having mild postnasal drip.  She was advised not to take Augmentin/penicillins in the future and prescribed an Epipen.     Review of Systems:   Constitutional:  No weight changes, fever, or chills. No night sweats, no fatigue, no malaise.    Cardiovascular:  No chest pain, no palpitations, no edema.      Respiratory:  No cough, no smoke exposure, no dyspnea, no orthopnea.   Gastrointestinal:  No nausea, vomiting, or diarrhea. No constipation, or GI discomfort. No reflux pain, no anorexia, no dysphagia. No hematochezia or melena.    Neuro:  No weakness, no numbness, no paresthesias, no loss of consciousness, no syncope, no dizziness, no headache.     Objective   Physical Exam:   Constitutional: Awake, alert. Well developed for age. Nontoxic appearing.   Eyes: PERRL x2, sclerae anicteric, no conjunctival injection. No EOM abnormalities noted.   HENT: NCAT, mucous membranes moist,   Neck: Supple, no thyromegaly, no lymphadenopathy, trachea midline  Respiratory: Clear to auscultation bilaterally, nonlabored respirations   Cardiovascular: RRR, no murmurs, rubs, or gallops, palpable pedal pulses bilaterally. No appreciable edema.   Gastrointestinal: Positive bowel sounds, soft, nontender, not distended.   Musculoskeletal: No bilateral ankle edema, no clubbing or cyanosis to extremities.  No obvious deformities.   Psychiatric: Appropriate affect, cooperative. Converses appropriately for age.   Neurologic: Oriented x 3, strength symmetric in all extremities. Cranial nerves grossly intact to confrontation, speech clear  Skin: No rashes, skin intact.     Results Review:    I have reviewed the labs, radiology results and diagnostic studies.    Results from last 7 days   Lab Units 02/16/25  0408   WBC 10*3/mm3 25.90*   HEMOGLOBIN g/dL 12.3   HEMATOCRIT % 37.5   PLATELETS 10*3/mm3 336     Results from last 7 days   Lab Units 02/16/25  0408 02/15/25  1219   SODIUM mmol/L 139 136   POTASSIUM mmol/L 4.0 3.0*   CHLORIDE mmol/L 107 100   CO2 mmol/L 20.7* 24.2   BUN mg/dL 13 18   CREATININE mg/dL 0.76 0.95   CALCIUM mg/dL 9.3 8.9   BILIRUBIN mg/dL  --  0.2   ALK PHOS U/L  --  71   ALT (SGPT) U/L  --  18   AST (SGOT) U/L  --  21   GLUCOSE mg/dL 139* 161*     Imaging Results (Last 24 Hours)       ** No results found for the last 24 hours. **            I have reviewed the medications.     Discharge Medications        Changes to Medications        Instructions Start Date   topiramate 50 MG tablet  Commonly known as: Topamax  What changed:   how much to take  how to take this  when to take this  reasons to take this   1/2 tablet p.o. daily x 7 days then may increase to 1 whole tablet p.o. daily x 7 days then may increase to 1 tablet twice a day             Continue These Medications        Instructions Start Date   albuterol sulfate  (90 Base) MCG/ACT inhaler  Commonly known as: PROVENTIL HFA;VENTOLIN HFA;PROAIR HFA   2 puffs, Inhalation, 4 Times Daily - RT      amphetamine-dextroamphetamine 20 MG tablet  Commonly known as: ADDERALL   1 tablet, 2 Times Daily      APO-Varenicline 1 MG tablet  Generic drug: varenicline   1 mg, Oral, 2 Times Daily      azelastine 0.1 % nasal spray  Commonly known as: ASTELIN   2 sprays, Nasal, 2 Times Daily, Use in each nostril as directed      clindamycin 1 % external  solution  Commonly known as: CLEOCIN T   APPLY TO AFFECTED AREA(S) ON SCALP ONCE DAILY AS NEEDED      ezetimibe 10 MG tablet  Commonly known as: Zetia   10 mg, Oral, Daily      levocetirizine 5 MG tablet  Commonly known as: XYZAL   5 mg, Daily With Breakfast      montelukast 10 MG tablet  Commonly known as: SINGULAIR   TAKE 1 (ONE) TABLET BY MOUTH NIGHTLY      multivitamin with minerals tablet tablet   1 tablet, Daily      olmesartan-hydrochlorothiazide 40-25 MG per tablet  Commonly known as: BENICAR HCT   1 tablet, Oral, Daily      omeprazole 40 MG capsule  Commonly known as: priLOSEC   TAKE 1 (ONE) CAPSULE BY MOUTH TWICE DAILY      ondansetron ODT 8 MG disintegrating tablet  Commonly known as: ZOFRAN-ODT   PLACE 1 (ONE) TABLET ON TONGUE 3 TIMES DAILY      triamcinolone 0.5 % ointment  Commonly known as: KENALOG   1 Application, Topical, 2 Times Daily             ASK your doctor about these medications        Instructions Start Date   EPINEPHrine 0.3 MG/0.3ML solution auto-injector injection  Commonly known as: EpiPen 2-Manfred  Ask about: Should I take this medication?   0.3 mg, Intramuscular, Once              ---------------------------------------------------------------------------------------------  Assessment & Plan   Assessment/Problem List    Anaphylaxis    Plan:    Anaphylaxis  -Continuous telemetry  -Patient given 2 doses of IM epi 2/15  -Patient received Benadryl, Solu-Medrol and Pepcid in ED, will monitor for rebound symptoms  --EpiPen prescribed to patient's outpatient pharmacy     Hypokalemia, resolved  - K 4 today     Hypertension  -Continue home medications    Disposition: Home    Follow-up after Discharge: Primary care    This note will serve as a discharge summary    Elisabeth Olivia, APRN 02/16/25 09:12 EST    I have worn appropriate PPE during this patient encounter, sanitized my hands both with entering and exiting patient's room.    32 minutes has been spent by [facility] providers in the care of this patient while under observation status

## 2025-02-16 NOTE — PROGRESS NOTES
GHADA ARANGO Attestation Note     I supervised care provided by the midlevel provider. We have discussed this patient's history, physical exam, and treatment plan. I have reviewed the midlevel provider's note and I agree with the midlevel provider's findings and plan of care.   SHARED VISIT: This visit was performed by BOTH a physician and an APC. The substantive portion of the medical decision making was performed by this attesting physician who made or approved the management plan and takes responsibility for patient management. All studies in the APC note (if performed) were independently interpreted by me.   I have personally had a face to face encounter with the patient.   My personal findings are documented below:      Subjective  Pt is a 54 y.o. female admitted from Emergency Department for evaluation and treatment of anaphylactic reaction to Augmentin.  Patient did have difficulty breathing and swelling and was treated with epinephrine, Solu-Medrol, Benadryl and Pepcid.  Symptoms have fully resolved and she feels much better.    Physical Exam  GENERAL: Alert and in NAD, Vitals, clear to auscultation bilaterally-O2 sats upper 90s room air  HENT: nares patent  EYES: no scleral icterus  CV: regular rhythm, regular rate  RESPIRATORY: normal effort  ABDOMEN: soft  MUSCULOSKELETAL: no deformity  NEURO: Strength sensation and coordination are grossly intact.  Speech and mentation are unremarkable  SKIN: warm, dry-no noted erythema or hives    Assessment/Plan  I discussed tx and evaluation of this patient with MATHIEU Olivia.  Symptoms resolved after given medications.  Will discharge home, will give EpiPen.

## 2025-02-16 NOTE — OUTREACH NOTE
Prep Survey      Flowsheet Row Responses   [facility] patient discharged from? [place]   Is LACE score < 7 ? Yes   Eligibility [facility]   Date of Admission 02/15/25   Date of Discharge 02/16/25   Discharge Disposition Home or Self Care   Discharge diagnosis Anaphylaxis   Does the patient have one of the following disease processes/diagnoses(primary or secondary)? Other   Does the patient have Home health ordered? No   Is there a DME ordered? No   Prep survey completed? Yes            FOZIA MONREAL - Registered Nurse

## 2025-02-16 NOTE — PLAN OF CARE
Goal Outcome Evaluation:  Plan of Care Reviewed With: patient        Progress: improving  Outcome Evaluation: Patient is alert and oriented times 4. On room air. Vital signs are stable. Up ad anju with activity. AVS and discharge instructions given to patient. Questions and concerns answer. Patient is agreeable with plan of care. IV removed. Prescription sent to pharmacy of choice. Patient left with mother via private vehicle with all personal belongings.

## 2025-02-16 NOTE — CASE MANAGEMENT/SOCIAL WORK
Case Management Discharge Note      Final Note: dc home         Selected Continued Care - Discharged on 2/16/2025 Admission date: 2/15/2025 - Discharge disposition: Home or Self Care      Destination    No services have been selected for the patient.                Durable Medical Equipment    No services have been selected for the patient.                Dialysis/Infusion    No services have been selected for the patient.                Home Medical Care    No services have been selected for the patient.                Therapy    No services have been selected for the patient.                Community Resources    No services have been selected for the patient.                Community & DME    No services have been selected for the patient.                         Final Discharge Disposition Code: 01 - home or self-care

## 2025-02-17 ENCOUNTER — TRANSITIONAL CARE MANAGEMENT TELEPHONE ENCOUNTER (OUTPATIENT)
Dept: CALL CENTER | Facility: HOSPITAL | Age: 55
End: 2025-02-17
Payer: COMMERCIAL

## 2025-02-17 ENCOUNTER — TELEPHONE (OUTPATIENT)
Dept: GASTROENTEROLOGY | Facility: CLINIC | Age: 55
End: 2025-02-17

## 2025-02-17 ENCOUNTER — TELEPHONE (OUTPATIENT)
Dept: GASTROENTEROLOGY | Facility: CLINIC | Age: 55
End: 2025-02-17
Payer: COMMERCIAL

## 2025-02-17 NOTE — TELEPHONE ENCOUNTER
Caller: Leann Moctezuma    Relationship to patient: Self    Best call back number: [phone]    Patient is needing: PT WAS IN THE HOSP OVER THERE WEEKEND AND WENT INTO [place] DUE TO HAVING AN ALLERGIC REACTION TO AN ANTIBIOTIC. SHE WAS GIVEN STEROIDS AND OTHER THINGS. SHE HAS A COLONOSCOPY THURS AND WANTS TO KNOW IF SHE IS NEEDING TO RESCHEDULE. PLEASE CALL PT ASAP TO ADVISE

## 2025-02-17 NOTE — TELEPHONE ENCOUNTER
Hub staff attempted to follow warm transfer process and was unsuccessful         Caller: Lawanda Moctezuman AMBER    Relationship to patient: Self    Best call back number: [phone]    Chief complaint: SEE NOTE 02.17.25    Type of visit: SCOPE    Requested date: FIRST AVAILABLE     If rescheduling, when is the original appointment: 02.20.25     Additional notes:PT CALLING TO ADVISE SHE ACTUALLY WANTS TO GO AHEAD AND CANCEL/RESCHEDULE SCOPE.  PLEASE CONTACT PT TO RESCHEDULE.

## 2025-02-17 NOTE — OUTREACH NOTE
Call Center TCM Note      Flowsheet Row Responses   [facility] patient discharged from? [place]   Does the patient have one of the following disease processes/diagnoses(primary or secondary)? Other   TCM attempt successful? Yes   Call start time 1406   Call end time 1411   Discharge diagnosis Anaphylaxis   Person spoke with today (if not patient) and relationship pt   Meds reviewed with patient/caregiver? Yes   Is the patient having any side effects they believe may be caused by any medication additions or changes? No   Does the patient have all medications ordered at discharge? Yes   Is the patient taking all medications as directed (includes completed medication regime)? Yes   Does the patient have an appointment with their PCP within 7-14 days of discharge? Yes  [2/19/2025  8:30 AM]   Psychosocial issues? No   Did the patient receive a copy of their discharge instructions? Yes   Nursing interventions Reviewed instructions with patient   What is the patient's perception of their health status since discharge? Improving   Is the patient/caregiver able to teach back signs and symptoms related to disease process for when to call PCP? Yes   Is the patient/caregiver able to teach back signs and symptoms related to disease process for when to call 911? Yes   Is the patient/caregiver able to teach back the hierarchy of who to call/visit for symptoms/problems? PCP, Specialist, Home health nurse, Urgent Care, ED, 911 Yes   If the patient is a current smoker, are they able to teach back resources for cessation? Not a smoker   TCM call completed? Yes   Wrap up additional comments Pt states she is doing better, and steriods she is taking is keeping her from sleeping. Pt verified PCP fu appt on 2/19/25.   Call end time 1411   Would this patient benefit from a Referral to Amb Social Work? No   Is the patient interested in additional calls from an ambulatory ? No            Megha Qiu  RN    2/17/2025, 14:13 EST

## 2025-02-17 NOTE — TELEPHONE ENCOUNTER
Hub staff attempted to follow warm transfer process and was unsuccessful         Caller: Lawanda Moctezuman AMBER    Relationship to patient: Self    Best call back number: [phone]    Chief complaint: SEE NOTE 02.17.25    Type of visit: SCOPE    Requested date: FIRST AVAILABLE     If rescheduling, when is the original appointment: 02.20.25     Additional notes:PT CALLING TO ADVISE SHE ACTUALLY WANTS TO GO AHEAD AND CANCEL/RESCHEDULE SCOPE.  PLEASE CONTACT PT TO RESCHEDULE.

## 2025-02-19 ENCOUNTER — OFFICE VISIT (OUTPATIENT)
Dept: FAMILY MEDICINE CLINIC | Facility: CLINIC | Age: 55
End: 2025-02-19
Payer: COMMERCIAL

## 2025-02-19 VITALS
HEART RATE: 100 BPM | WEIGHT: 209.2 LBS | DIASTOLIC BLOOD PRESSURE: 74 MMHG | BODY MASS INDEX: 34.85 KG/M2 | OXYGEN SATURATION: 100 % | HEIGHT: 65 IN | SYSTOLIC BLOOD PRESSURE: 116 MMHG | TEMPERATURE: 97.6 F

## 2025-02-19 DIAGNOSIS — T78.2XXD ANAPHYLAXIS, SUBSEQUENT ENCOUNTER: ICD-10-CM

## 2025-02-19 DIAGNOSIS — E78.01 FAMILIAL HYPERCHOLESTEROLEMIA: ICD-10-CM

## 2025-02-19 DIAGNOSIS — E87.6 HYPOKALEMIA: ICD-10-CM

## 2025-02-19 DIAGNOSIS — Z78.9 MEDICALLY COMPLEX PATIENT: ICD-10-CM

## 2025-02-19 DIAGNOSIS — M05.80 POLYARTHRITIS WITH POSITIVE RHEUMATOID FACTOR: ICD-10-CM

## 2025-02-19 DIAGNOSIS — I10 ESSENTIAL HYPERTENSION: ICD-10-CM

## 2025-02-19 DIAGNOSIS — Z09 HOSPITAL DISCHARGE FOLLOW-UP: Primary | ICD-10-CM

## 2025-02-19 DIAGNOSIS — J45.20 MILD INTERMITTENT ASTHMA WITHOUT COMPLICATION: ICD-10-CM

## 2025-02-19 DIAGNOSIS — R76.8 POSITIVE ANA (ANTINUCLEAR ANTIBODY): ICD-10-CM

## 2025-02-19 DIAGNOSIS — D72.829 LEUKOCYTOSIS, UNSPECIFIED TYPE: ICD-10-CM

## 2025-02-19 DIAGNOSIS — J01.91 ACUTE RECURRENT SINUSITIS, UNSPECIFIED LOCATION: ICD-10-CM

## 2025-02-19 DIAGNOSIS — E66.811 OBESITY, CLASS I, BMI 30-34.9: ICD-10-CM

## 2025-02-19 DIAGNOSIS — R79.89 ABNORMAL TSH: ICD-10-CM

## 2025-02-19 LAB
BASOPHILS # BLD AUTO: ABNORMAL 10*3/UL
BUN SERPL-MCNC: 19 MG/DL (ref 6–20)
BUN/CREAT SERPL: 17.9 (ref 7–25)
CALCIUM SERPL-MCNC: 9.9 MG/DL (ref 8.6–10.5)
CHLORIDE SERPL-SCNC: 99 MMOL/L (ref 98–107)
CO2 SERPL-SCNC: 29.3 MMOL/L (ref 22–29)
CREAT SERPL-MCNC: 1.06 MG/DL (ref 0.57–1)
DIFFERENTIAL COMMENT: ABNORMAL
EGFRCR SERPLBLD CKD-EPI 2021: 62.9 ML/MIN/1.73
EOSINOPHIL # BLD AUTO: ABNORMAL 10*3/UL
EOSINOPHIL # BLD MANUAL: 0.61 10*3/MM3 (ref 0–0.4)
EOSINOPHIL NFR BLD AUTO: ABNORMAL %
EOSINOPHIL NFR BLD MANUAL: 4.2 % (ref 0.3–6.2)
ERYTHROCYTE [DISTWIDTH] IN BLOOD BY AUTOMATED COUNT: 12.6 % (ref 12.3–15.4)
GLUCOSE SERPL-MCNC: 78 MG/DL (ref 65–99)
HCT VFR BLD AUTO: 43.6 % (ref 34–46.6)
HGB BLD-MCNC: 14.4 G/DL (ref 12–15.9)
LYMPHOCYTES # BLD AUTO: ABNORMAL 10*3/UL
LYMPHOCYTES # BLD MANUAL: 7.48 10*3/MM3 (ref 0.7–3.1)
LYMPHOCYTES NFR BLD AUTO: ABNORMAL %
LYMPHOCYTES NFR BLD MANUAL: 49 % (ref 19.6–45.3)
MCH RBC QN AUTO: 28.5 PG (ref 26.6–33)
MCHC RBC AUTO-ENTMCNC: 33 G/DL (ref 31.5–35.7)
MCV RBC AUTO: 86.3 FL (ref 79–97)
MONOCYTES # BLD MANUAL: 0.76 10*3/MM3 (ref 0.1–0.9)
MONOCYTES NFR BLD AUTO: ABNORMAL %
MONOCYTES NFR BLD MANUAL: 5.2 % (ref 5–12)
NEUTROPHILS # BLD MANUAL: 5.79 10*3/MM3 (ref 1.7–7)
NEUTROPHILS NFR BLD AUTO: ABNORMAL %
NEUTROPHILS NFR BLD MANUAL: 39.6 % (ref 42.7–76)
PLATELET # BLD AUTO: 390 10*3/MM3 (ref 140–450)
PLATELET BLD QL SMEAR: ABNORMAL
POTASSIUM SERPL-SCNC: 5.2 MMOL/L (ref 3.5–5.2)
RBC # BLD AUTO: 5.05 10*6/MM3 (ref 3.77–5.28)
RBC MORPH BLD: ABNORMAL
SODIUM SERPL-SCNC: 138 MMOL/L (ref 136–145)
WBC # BLD AUTO: 14.63 10*3/MM3 (ref 3.4–10.8)

## 2025-02-19 RX ORDER — AZELASTINE 1 MG/ML
2 SPRAY, METERED NASAL 2 TIMES DAILY
Qty: 1 EACH | Refills: 3 | Status: CANCELLED | OUTPATIENT
Start: 2025-02-19

## 2025-02-19 RX ORDER — SULFAMETHOXAZOLE AND TRIMETHOPRIM 800; 160 MG/1; MG/1
1 TABLET ORAL 2 TIMES DAILY
Qty: 20 TABLET | Refills: 0 | Status: CANCELLED | OUTPATIENT
Start: 2025-02-19

## 2025-02-19 RX ORDER — EPINEPHRINE 0.3 MG/.3ML
0.3 INJECTION SUBCUTANEOUS ONCE
COMMUNITY
Start: 2025-02-17

## 2025-02-19 RX ORDER — LEVOFLOXACIN 750 MG/1
750 TABLET, FILM COATED ORAL DAILY
Qty: 10 TABLET | Refills: 0 | Status: CANCELLED | OUTPATIENT
Start: 2025-02-19

## 2025-02-19 RX ORDER — DOXYCYCLINE 100 MG/1
100 CAPSULE ORAL 2 TIMES DAILY
Qty: 20 CAPSULE | Refills: 0 | Status: SHIPPED | OUTPATIENT
Start: 2025-02-19

## 2025-02-19 NOTE — PROGRESS NOTES
"Transitional Care Follow Up Visit  Subjective     Leann Moctezuma is a 54 y.o. female who presents for a transitional care management visit.    Within 48 business hours after discharge our office contacted her via telephone to coordinate her care and needs.      I reviewed and discussed the details of that call along with the discharge summary, hospital problems, inpatient lab results, inpatient diagnostic studies, and consultation reports with Leann.     Current outpatient and discharge medications have been reconciled for the patient.  Reviewed by: Jess Ricks MD          2/16/2025    12:42 PM   Date of TCM Phone Call   [facility]   Date of Admission 2/15/2025   Date of Discharge 2/16/2025   Discharge Disposition Home or Self Care     Risk for Readmission (LACE) Score: 3 (2/16/2025  6:00 AM)      Sinus Problem  Associated symptoms include congestion and sinus pressure. Pertinent negatives include no coughing or shortness of breath.      NEEDS 3-DAY HOSPITAL FOLLOW-UP  And  4-week follow-up on multiple lab abnormalities and complaints of \"another sinus infection.\"      Course During Hospital Stay:    ED to Hosp-Admission (Discharged) with Josue Chang MD; Mark Galarza MD; Mitesh Trejo MD (02/15/2025)   23-hour observation, [facility].  Discharge diagnosis --- anaphylaxis, hypokalemia  Admitted for monitoring after severe anaphylactic reaction to penicillin/Augmentin.  She had taken \"a leftover dose of Augmentin\" for another \"sinus infection\" and immediately started having itching all over her body.  This quickly progressed to swelling in her face and neck.  Immediately went to the urgent care and was given epinephrine IM.  Was transported by EMS to hospital, en route given another dose of epinephrine IM.  At the ER, treated with IV Benadryl, IV Solu-Medrol, and Pepcid.  Her symptoms did improve.  However was admitted for telemetry and monitoring given severe anaphylaxis that " "required 2 doses of epinephrine.  Also admitted for hypokalemia (potassium 3.) treated with KCl p.o.  She did well overnight.  No further swelling, shortness of breath.  Potassium replaced.  No further complications.  Discharged home with an EpiPen, told never to take penicillin again and to follow-up with her PCP soon.    Today, post hospital day #3.  No further facial swelling, neck swelling, or shortness of air.  No recurrent reactions.    Remains off penicillin and has removed it from her home.  No chest pain, SOA, palpitations, dizziness.    However, still with complaints of \"another sinus infection.\"  Symptoms first began about 1 week ago with lots of sinus congestion and pressure.  Starting to have some colored nasal discharge.  No fever, chills, wheezing.  No increased use of her albuterol MDI.  Longstanding history of chronic sinusitis, allergic rhinitis.  Multiple rounds of antibiotics and steroids in recent years.  She does have an ENT specialist, Dr. Jasson Esquivel.  Last visit almost 2 years ago, S/P CT sinus done at that time that showed opacification.  However per patient's report his exam did not match up with CT scan findings??    Also needs follow-up from last visit 4 weeks ago.  Seen for follow-up on uncontrolled hyperlipidemia, abnormal thyroid studies, abnormal BRAYAN, and preventative screening studies.  --Rechecked lipids since starting Zetia.  Much improved.  -- Declined treatment with Synthroid due to suspected new onset hypothyroidism (abnormal TSH).  Preferred to recheck thyroid studies, as she suspected her recent bout with COVID may have interfered with the labs?  -- Also requested her positive BRAYAN titer to be rechecked.  Was concerned that her labs were drawn at the time of her COVID illness?  -- Additional inflammatory markers were checked due to intermittent episodes of bilateral hand pain, especially given positive BRAYAN screen.  -- Discussed rescheduling her C-scope and low-dose CT chest " for lung cancer screening.  These are planned for next month.    Repeat labs resulted as below:  Progress Notes  Jess iRcks MD (Physician)  Family Medicine  Very elevated rheumatoid factor, along with pretty significant BRAYAN titer--- multiple joint arthralgia, yet little complaints.  However upon further history may have intermittent episodes of synovitis in her thumb upon review of further history.  Really does need referral to rheumatologist.  Please schedule referral to rheumatologist, let patient know.  This was discussed at her last appointment.  Follow-up with me as planned        Referral to Rheumatology for Positive BRAYAN (antinuclear antibody); Polyarthritis with positive rheumatoid factor (01/27/2025) --appointment scheduled for next month      Otherwise, doing well.  Still working full-time.  Quit smoking over 1 year ago.  Trying to improve upon a healthier diet, but has not increase cardio exercise much due to recent weather.  Also trying to increase potassium in her diet given low potassium during recent hospitalization and her blood pressure medication contains HCTZ.  Weight does remain stable.  Mood remains stable.  Adequate sleep.  Maintains regular follow-up with psychiatrist, no changes with Adderall.          The following portions of the patient's history were reviewed and updated as appropriate: allergies, current medications, past family history, past medical history, past social history, past surgical history, and problem list.    Review of Systems   Constitutional:  Negative for fever and unexpected weight change.   HENT:  Positive for congestion, rhinorrhea and sinus pressure.    Respiratory:  Negative for cough and shortness of breath.    Cardiovascular:  Negative for chest pain.       Objective   Physical Exam  Vitals and nursing note reviewed.   Constitutional:       Appearance: Normal appearance. She is well-developed.   HENT:      Head: Normocephalic and atraumatic.      Comments:  Frontal, maxillary tenderness and pressure     Right Ear: Tympanic membrane normal.      Left Ear: Tympanic membrane normal.      Nose: Nose normal. Congestion present.   Eyes:      Conjunctiva/sclera: Conjunctivae normal.      Pupils: Pupils are equal, round, and reactive to light.   Neck:      Thyroid: No thyromegaly.   Cardiovascular:      Rate and Rhythm: Normal rate and regular rhythm.      Heart sounds: Normal heart sounds. No murmur heard.  Pulmonary:      Effort: Pulmonary effort is normal. No respiratory distress.      Breath sounds: Normal breath sounds. No wheezing or rales.   Abdominal:      General: Abdomen is flat. Bowel sounds are normal. There is no distension.      Palpations: Abdomen is soft. There is no hepatomegaly, splenomegaly or mass.      Tenderness: There is no abdominal tenderness. There is no guarding or rebound.      Hernia: No hernia is present.   Musculoskeletal:         General: Normal range of motion.      Cervical back: Normal range of motion and neck supple.      Right lower leg: No edema.      Left lower leg: No edema.   Lymphadenopathy:      Cervical: No cervical adenopathy.   Skin:     General: Skin is warm.   Neurological:      General: No focal deficit present.      Mental Status: She is alert.   Psychiatric:         Mood and Affect: Mood normal.         Behavior: Behavior normal.         Thought Content: Thought content normal.         Judgment: Judgment normal.         Common labs          1/13/2025    10:37 2/15/2025    11:09 2/15/2025    12:19 2/16/2025    04:08   Common Labs   Glucose 92   161  139    BUN 14   18  13    Creatinine 0.88   0.95  0.76    Sodium 139   136  139    Potassium 4.4   3.0  4.0    Chloride 104   100  107    Calcium 9.7   8.9  9.3    Albumin 4.1   4.0     Total Bilirubin 0.4   0.2     Alkaline Phosphatase 77   71     AST (SGOT) 24   21     ALT (SGPT) 31   18     WBC  13.74   25.90    Hemoglobin  14.1   12.3    Hematocrit  43.2   37.5    Platelets  424    336    Total Cholesterol 206       Triglycerides 126       HDL Cholesterol 50       LDL Cholesterol  133              Lab Results   Component Value Date    ANADIRECT Positive (A) 10/16/2024    JQZQ16KG 38.8 01/17/2025    FOLATE >20.0 10/27/2023      Rheumatoid Factor (01/17/2025 10:57) --188  BRAYAN by IFA, Reflex to Titer and Pattern (01/17/2025 10:57)     JONNIE Staining Patterns (01/17/2025 10:57) --titer 1:160    TSH (01/17/2025 10:57) --3.1        Procedures     Assessment & Plan   Diagnoses and all orders for this visit:    1. Hospital discharge follow-up (Primary) --post hospital day #3  S/P severe anaphylactic reaction to Augmentin, 23-hour observation with telemetry.  Also with diagnosis of hypokalemia, leukocytosis  All hospital records and labs reviewed.    2. Anaphylaxis, subsequent encounter --severe anaphylactic reaction to Augmentin, requiring hospitalization.  Today she is post hospital day #3 and doing much better.  No further reoccurrence of symptoms.  Allergy documentation to penicillin made.  Has EpiPen prescribed.    3. Leukocytosis, unspecified type --new Dx  Recheck CBC today.  Most likely this was due to IV steroids at time of anaphylactic reaction?  However also with acute sinusitis.  -     CBC & Differential    4. Hypokalemia --new Dx, moderate  S/P KCl replacement during her hospitalization.  Recheck today, may need additional p.o. KCl given on Benicar HCT 40/25 mg daily  Although if possible she prefers to increase the potassium in her diet first.  Further recommendations to follow  -     Basic Metabolic Panel    5. Essential hypertension --controlled, no change with Benicar, see above plan with potassium    6. Acute recurrent sinusitis, unspecified location --acute on chronic sinusitis  Another acute infection requiring antibiotics.  May start doxycycline 100 mg p.o. twice daily x 10 days.  Stressed the importance that she really needs to follow-up with her ENT/Dr. Jasson Esquivel for further  workup of chronic sinusitis.  S/P CT sinus completed about 2 years ago, reviewed with patient today.  However ENT exam inconsistent with CT findings?  Consider allergy testing, currently on maximum treatment for chronic allergic rhinitis    7. Mild intermittent asthma without complication --stable  No recurrent asthma exacerbations.  May continue albuterol as needed    8. Positive BRAYAN (antinuclear antibody) --new Dx, significant titer  Given significant titer, along with significantly elevated rheumatoid factor and clinical history of intermittent pain with swelling in hands do suspect rheumatoid arthritis?  Referral has been placed to rheumatologist, appointment next month.    9. Polyarthritis with positive rheumatoid factor --new Dx  Significantly elevated RF factor of 188, along with high titer BRAYAN.  Suspect RA.  Referral has been placed to rheumatologist    10. Familial hypercholesterolemia --stable  Lipids up-to-date, improved with Zetia.    11. Abnormal TSH --borderline TSH  Along with difficulty losing weight, need to monitor closely.  Plan to recheck in 3 months    12. Obesity, Class I, BMI 30-34.9 --remains uncontrolled  See above plan for thyroid monitoring.  Failed GLP-1's.  Plans to make some significant improvements with healthy lifestyle and much-needed exercise--- greater than 150 minutes weekly    13. Medically complex patient -- See all the above active chronic diagnoses that require close monitoring and longitudinal care.    Other orders  -     doxycycline (VIBRAMYCIN) 100 MG capsule; Take 1 capsule by mouth 2 (Two) Times a Day.  Dispense: 20 capsule; Refill: 0      In addition to TCM visit for hospital follow-up, also seen and treated for separate and identifiable new Dx acute on chronic sinusitis, new Dx positive BRAYAN and rheumatoid factor

## 2025-02-19 NOTE — PATIENT INSTRUCTIONS
Increase potassium in diet    Start doxycycline twice a day for 10 days for acute sinusitis    Follow-up with Dr. Esquivel regarding chronic sinusitis    Proceed to see rheumatologist as planned    Proceed with lung cancer screening test as ordered    Continue to improve upon healthy lifestyle, doing a good job---Needs low-carb/low calorie/low cholesterol diet and increase cardio exercise to greater than 150 minutes weekly

## 2025-02-20 ENCOUNTER — OUTSIDE FACILITY SERVICE (OUTPATIENT)
Dept: GASTROENTEROLOGY | Facility: CLINIC | Age: 55
End: 2025-02-20
Payer: COMMERCIAL

## 2025-03-03 ENCOUNTER — HOSPITAL ENCOUNTER (OUTPATIENT)
Facility: HOSPITAL | Age: 55
Discharge: HOME | End: 2025-03-03
Admitting: FAMILY MEDICINE
Payer: COMMERCIAL

## 2025-03-03 DIAGNOSIS — Z87.891 PERSONAL HISTORY OF TOBACCO USE: ICD-10-CM

## 2025-03-03 PROCEDURE — 71271 CT THORAX LUNG CANCER SCR C-: CPT

## 2025-03-19 ENCOUNTER — OFFICE VISIT (OUTPATIENT)
Dept: FAMILY MEDICINE CLINIC | Facility: CLINIC | Age: 55
End: 2025-03-19
Payer: COMMERCIAL

## 2025-03-19 VITALS
HEIGHT: 65 IN | BODY MASS INDEX: 35.32 KG/M2 | SYSTOLIC BLOOD PRESSURE: 108 MMHG | OXYGEN SATURATION: 96 % | HEART RATE: 98 BPM | DIASTOLIC BLOOD PRESSURE: 64 MMHG | TEMPERATURE: 98.9 F | WEIGHT: 212 LBS

## 2025-03-19 DIAGNOSIS — R50.9 FEVER, UNSPECIFIED FEVER CAUSE: ICD-10-CM

## 2025-03-19 DIAGNOSIS — J10.1 INFLUENZA A: Primary | ICD-10-CM

## 2025-03-19 LAB
EXPIRATION DATE: ABNORMAL
FLUAV AG UPPER RESP QL IA.RAPID: DETECTED
FLUBV AG UPPER RESP QL IA.RAPID: NOT DETECTED
INTERNAL CONTROL: ABNORMAL
Lab: ABNORMAL
SARS-COV-2 AG UPPER RESP QL IA.RAPID: NOT DETECTED

## 2025-03-19 PROCEDURE — 99214 OFFICE O/P EST MOD 30 MIN: CPT | Performed by: STUDENT IN AN ORGANIZED HEALTH CARE EDUCATION/TRAINING PROGRAM

## 2025-03-19 PROCEDURE — 87428 SARSCOV & INF VIR A&B AG IA: CPT | Performed by: STUDENT IN AN ORGANIZED HEALTH CARE EDUCATION/TRAINING PROGRAM

## 2025-03-19 RX ORDER — OSELTAMIVIR PHOSPHATE 75 MG/1
75 CAPSULE ORAL 2 TIMES DAILY
Qty: 10 CAPSULE | Refills: 0 | Status: SHIPPED | OUTPATIENT
Start: 2025-03-19

## 2025-03-19 RX ORDER — BENZONATATE 100 MG/1
100 CAPSULE ORAL 3 TIMES DAILY PRN
Qty: 30 CAPSULE | Refills: 0 | Status: SHIPPED | OUTPATIENT
Start: 2025-03-19

## 2025-03-19 RX ORDER — DEXTROMETHORPHAN HYDROBROMIDE AND PROMETHAZINE HYDROCHLORIDE 15; 6.25 MG/5ML; MG/5ML
5 SYRUP ORAL 4 TIMES DAILY PRN
Qty: 240 ML | Refills: 0 | Status: SHIPPED | OUTPATIENT
Start: 2025-03-19

## 2025-03-19 NOTE — LETTER
March 19, 2025     Patient: Leann CLARK Self   YOB: 1970   Date of Visit: 3/19/2025       To Whom It May Concern:    Leann was seen in my office on 3/19/25 and diagnosed with a contagious illness. I would not advise return to physical activity until 3/24/25.       Please let me know if you have questions or concerns do not hesitate to contact my office.         Sincerely,        Jasmina Duffy MD    CC: No Recipients

## 2025-03-19 NOTE — PROGRESS NOTES
"Chief Complaint  Fever (Fever onset yesterday. ), URI, Chills, and Cough    Subjective        Leann CLARK Self presents to [facility]  History of Present Illness  54-year-old female with asthma who presents for 2 days of bodyaches, fever, fatigue, cough.    Monday night began experiencing bodyaches.  Thought that this was related to cleaning out her mother's garage.  However she began having chills and fever.  She has also been experiencing increased cough and congestion.  Also having some chest tightness she has been using her albuterol inhaler as well as Tylenol for fever.  She thinks today she is feeling somewhat better.    Cough continues to be bothersome and productive in nature.      Objective   Vital Signs:  /64   Pulse 98   Temp 98.9 °F (37.2 °C)   Ht 165.1 cm (65\")   Wt 96.2 kg (212 lb)   SpO2 96%   BMI 35.28 kg/m²   Estimated body mass index is 35.28 kg/m² as calculated from the following:    Height as of this encounter: 165.1 cm (65\").    Weight as of this encounter: 96.2 kg (212 lb).        Physical Exam  Constitutional:       General: She is not in acute distress.  Eyes:      Conjunctiva/sclera: Conjunctivae normal.   Cardiovascular:      Rate and Rhythm: Normal rate and regular rhythm.   Pulmonary:      Effort: Pulmonary effort is normal. No respiratory distress.   Neurological:      Mental Status: She is alert and oriented to person, place, and time.   Psychiatric:         Mood and Affect: Mood normal.         Behavior: Behavior normal.        Result Review :  The following data was reviewed by: Jasmina Duffy MD on 03/19/2025:  Common labs          2/15/2025    11:09 2/15/2025    12:19 2/16/2025    04:08 2/19/2025    09:52   Common Labs   Glucose  161  139  78    BUN  18  13  19    Creatinine  0.95  0.76  1.06    Sodium  136  139  138    Potassium  3.0  4.0  5.2    Chloride  100  107  99    Calcium  8.9  9.3  9.9    Albumin  4.0      Total Bilirubin  0.2    "   Alkaline Phosphatase  71      AST (SGOT)  21      ALT (SGPT)  18      WBC 13.74   25.90  14.63    Hemoglobin 14.1   12.3  14.4    Hematocrit 43.2   37.5  43.6    Platelets 424   336  390      Data reviewed : none           Assessment and Plan   Diagnoses and all orders for this visit:    1. Influenza A (Primary)  -     oseltamivir (Tamiflu) 75 MG capsule; Take 1 capsule by mouth 2 (Two) Times a Day.  Dispense: 10 capsule; Refill: 0  -     promethazine-dextromethorphan (PROMETHAZINE-DM) 6.25-15 MG/5ML syrup; Take 5 mL by mouth 4 (Four) Times a Day As Needed for Cough.  Dispense: 240 mL; Refill: 0  -     benzonatate (Tessalon Perles) 100 MG capsule; Take 1 capsule by mouth 3 (Three) Times a Day As Needed for Cough.  Dispense: 30 capsule; Refill: 0             Follow Up   No follow-ups on file.  Patient was given instructions and counseling regarding her condition or for health maintenance advice. Please see specific information pulled into the AVS if appropriate.

## 2025-03-19 NOTE — PROGRESS NOTES
"Chief Complaint  Fever (Fever onset yesterday. ), URI, Chills, and Cough    Subjective    {Problem List  Visit Diagnosis   Encounters  Notes  Medications  Labs  Result Review Imaging  Media :23}    Leann CLARK Self presents to [facility]  History of Present Illness              Objective   Vital Signs:  /64   Pulse 98   Temp 98.9 °F (37.2 °C)   Ht 165.1 cm (65\")   Wt 96.2 kg (212 lb)   SpO2 96%   BMI 35.28 kg/m²   Estimated body mass index is 35.28 kg/m² as calculated from the following:    Height as of this encounter: 165.1 cm (65\").    Weight as of this encounter: 96.2 kg (212 lb).        Physical Exam   Result Review :{Labs  Result Review  Imaging  Med Tab  Media  Procedures :23}  {The following data was reviewed by (Optional):33255}  {Ambulatory Labs (Optional):07229}  {Data reviewed (Optional):43993:::1}          Assessment and Plan {CC Problem List  Visit Diagnosis   ROS  Review (Popup)  Health Maintenance  Quality  BestPractice  Medications  SmartSets  SnapShot Encounters  Media :23}  There are no diagnoses linked to this encounter.                 {Time Spent (Optional):56608}  Follow Up {Instructions Charge Capture  Follow-up Communications :23}  No follow-ups on file.  Patient was given instructions and counseling regarding her condition or for health maintenance advice. Please see specific information pulled into the AVS if appropriate.     {[facility] Provider Statement:58687}            "

## 2025-03-25 ENCOUNTER — OFFICE VISIT (OUTPATIENT)
Age: 55
End: 2025-03-25
Payer: COMMERCIAL

## 2025-03-25 VITALS
HEART RATE: 80 BPM | BODY MASS INDEX: 35.39 KG/M2 | TEMPERATURE: 97.7 F | SYSTOLIC BLOOD PRESSURE: 126 MMHG | HEIGHT: 65 IN | OXYGEN SATURATION: 98 % | WEIGHT: 212.4 LBS | DIASTOLIC BLOOD PRESSURE: 80 MMHG

## 2025-03-25 DIAGNOSIS — M79.89 INTERMITTENT PAIN AND SWELLING OF HAND: ICD-10-CM

## 2025-03-25 DIAGNOSIS — R76.8 ELEVATED RHEUMATOID FACTOR: ICD-10-CM

## 2025-03-25 DIAGNOSIS — M79.643 INTERMITTENT PAIN AND SWELLING OF HAND: ICD-10-CM

## 2025-03-25 DIAGNOSIS — M19.90 INFLAMMATORY ARTHRITIS: ICD-10-CM

## 2025-03-25 DIAGNOSIS — M18.0 OSTEOARTHRITIS OF CARPOMETACARPAL (CMC) JOINT OF BOTH THUMBS: ICD-10-CM

## 2025-03-25 DIAGNOSIS — R76.8 POSITIVE ANA (ANTINUCLEAR ANTIBODY): Primary | ICD-10-CM

## 2025-03-25 RX ORDER — CELECOXIB 200 MG/1
200 CAPSULE ORAL DAILY
Qty: 30 CAPSULE | Refills: 3 | Status: SHIPPED | OUTPATIENT
Start: 2025-03-25

## 2025-03-25 NOTE — PROGRESS NOTES
RHEUMATOLOGY NEW PATIENT VISIT  3/25/2025  Patient Name: Leann Moctezuma : 1970 Medical Record: [id number]  PCP: Jess Ricks MD    Referring provider: Jess Ricks    REASON FOR CONSULTATION  Evaluate patient with positive BRAYAN, Polyarthritis with elevated RF  History of Present Illness  Leann Moctezuma is a 54 y.o. female  who presents for evaluation  for positive BRAYAN, Polyarthritis with elevated RF    She reports experiencing progressive aches and pains in her hands, predominantly in the thumbs, over the past few years. Her primary care physician conducted a blood test, which returned positive results, prompting a referral to a rheumatologist.    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     She experiences morning hand stiffness, which she manages by immediately starting work, but notes that her hand pain worsens by the end of the day, particularly after working all jobs in one day.     She has tested positive for either BRAYAN or rheumatoid factor twice, once in 10/2024 during a COVID-19 infection, which she believes may have skewed the results, and again in 2024. She is unaware of any BRAYAN testing in .     Review of rheum system history:  Review is negative for: Alopecia (hair loss), Malar rash, Mouth ulcer, Photosensitivity, Discoid lupus, Raynaud's phenomenon, Pleuritic chest pain, Headache, Hematuria, Psychosis, seizures, Vasculitic rash, Urinary symptoms, Red eye (uveitis), Psoriasis, Recurrent/previous infections: gastroenteritis, STDs, tonsillitis, Dysphagia, IBD, Inflammatory back pain, Plantar fasciitis/Achilles tendinitis    Detailed obstetric history: She reports no difficulty with pregnancy or history of miscarriage. She reports no history of DVT or pulmonary embolism.    SOCIAL HISTORY  The patient is a former smoker who quit on 2023. She consumes alcohol on  occasion but does not use recreational drugs. She is single with a 32-year-old son.    FAMILY HISTORY  The patient suspects her paternal grandmother may have had rheumatoid arthritis but is uncertain due to lack of family history knowledge. She is unaware of any family history of lupus, scleroderma, vasculitis, Sjogren's, sarcoidosis, or gout.    ALLERGIES  The patient has had an anaphylactic reaction to AUGMENTIN.    MEDICATIONS  Current: ibuprofen, omeprazole  Past: thyroid medication    Results  Laboratory Studies  2/19/2025  WBC 15.63 (25.9  1-month ago)  1/17/2025:  BRAYAN 1:160, homogeneous pattern, sed rate WNL, rheumatoid factor I88.1, vitamin D WNL, TSH 3.1 (was 4.410 in 10/16/2024)  10/16/2024-reflex DNA-15       Current Outpatient Medications:     albuterol sulfate  (90 Base) MCG/ACT inhaler, Inhale 2 puffs 4 (Four) Times a Day., Disp: 18 g, Rfl: 1    amphetamine-dextroamphetamine (ADDERALL) 20 MG tablet, Take 1 tablet by mouth 2 (Two) Times a Day., Disp: , Rfl:     azelastine (ASTELIN) 0.1 % nasal spray, 2 sprays into the nostril(s) as directed by provider 2 (Two) Times a Day. Use in each nostril as directed (Patient taking differently: Administer 2 sprays into the nostril(s) as directed by provider 2 (Two) Times a Day As Needed for Allergies. Use in each nostril as directed), Disp: 1 each, Rfl: 3    clindamycin (CLEOCIN T) 1 % external solution, APPLY TO AFFECTED AREA(S) ON SCALP ONCE DAILY AS NEEDED, Disp: , Rfl:     EPINEPHrine (EPIPEN) 0.3 MG/0.3ML solution auto-injector injection, Inject 0.3 mL under the skin into the appropriate area as directed 1 (One) Time., Disp: , Rfl:     ezetimibe (Zetia) 10 MG tablet, Take 1 tablet by mouth Daily., Disp: 30 tablet, Rfl: 5    levocetirizine (XYZAL) 5 MG tablet, Take 1 tablet by mouth Daily With Breakfast., Disp: , Rfl:     montelukast (SINGULAIR) 10 MG tablet, TAKE 1 (ONE) TABLET BY MOUTH NIGHTLY, Disp: 90 tablet, Rfl: 1    multivitamin with minerals  (MULTIVITAMIN ADULT PO), Take 1 tablet by mouth Daily., Disp: , Rfl:     olmesartan-hydrochlorothiazide (BENICAR HCT) 40-25 MG per tablet, Take 1 tablet by mouth Daily., Disp: 90 tablet, Rfl: 1    omeprazole (priLOSEC) 40 MG capsule, TAKE 1 (ONE) CAPSULE BY MOUTH TWICE DAILY, Disp: 60 capsule, Rfl: 12    ondansetron ODT (ZOFRAN-ODT) 8 MG disintegrating tablet, PLACE 1 (ONE) TABLET ON TONGUE 3 TIMES DAILY, Disp: 90 tablet, Rfl: 5    APO-Varenicline 1 MG tablet, TAKE 1 (ONE) TABLET BY MOUTH TWICE DAILY (Patient not taking: Reported on 3/25/2025), Disp: 60 tablet, Rfl: 3    benzonatate (Tessalon Perles) 100 MG capsule, Take 1 capsule by mouth 3 (Three) Times a Day As Needed for Cough. (Patient not taking: Reported on 3/25/2025), Disp: 30 capsule, Rfl: 0    celecoxib (CeleBREX) 200 MG capsule, Take 1 capsule by mouth Daily., Disp: 30 capsule, Rfl: 3    doxycycline (VIBRAMYCIN) 100 MG capsule, Take 1 capsule by mouth 2 (Two) Times a Day. (Patient not taking: Reported on 3/25/2025), Disp: 20 capsule, Rfl: 0    oseltamivir (Tamiflu) 75 MG capsule, Take 1 capsule by mouth 2 (Two) Times a Day. (Patient not taking: Reported on 3/25/2025), Disp: 10 capsule, Rfl: 0    promethazine-dextromethorphan (PROMETHAZINE-DM) 6.25-15 MG/5ML syrup, Take 5 mL by mouth 4 (Four) Times a Day As Needed for Cough. (Patient not taking: Reported on 3/25/2025), Disp: 240 mL, Rfl: 0    triamcinolone (KENALOG) 0.5 % ointment, Apply 1 Application topically to the appropriate area as directed 2 (Two) Times a Day. (Patient not taking: Reported on 3/25/2025), Disp: 15 g, Rfl: 0     There are no discontinued medications.     Past Medical History:   Diagnosis Date    ADHD (attention deficit hyperactivity disorder)     Attention deficit disorder (ADD) in adult     Encounter for screening for malignant neoplasm of colon 11/12/2024    Hyperlipidemia     Hypertension     Influenza        Past Surgical History:   Procedure Laterality Date    ADENOIDECTOMY       "SINUS SURGERY  2000    SUBTOTAL HYSTERECTOMY  11/2009    TONSILLECTOMY       Allergies   Allergen Reactions    Augmentin [Amoxicillin-Pot Clavulanate] Anaphylaxis    Codeine Itching and Nausea Only     Physical Exam      Vitals:    03/25/25 1513   BP: 126/80   BP Location: Left arm   Patient Position: Sitting   Cuff Size: Adult   Pulse: 80   Temp: 97.7 °F (36.5 °C)   TempSrc: Oral   SpO2: 98%   Weight: 96.3 kg (212 lb 6.4 oz)   Height: 165.1 cm (65\")     Gen: Well-developed, well-nourished adult in no apparent distress. Pleasant and cooperative. Alert and oriented. ?  HEENT: EOMI, MMM, oropharynx clear without oral ulcers, no lacrimal gland enlargement, normal salivary pooling, normal temporal arteries without tenderness or beading.?  Chest: Normal work of breathing. CTAB without wheezing/rhonchi/rales. ???  CV: RRR, normal S1/S2, no murmurs/rubs/gallops heard. ???  Extremities: Warm, 2+ distal pulses, no cyanosis, clubbing, or edema.?  Neuro: Good comprehension/cognition. Muscle strength 5/5 in all extremities. Gait normal.???  ??Skin: No alopecia, nail change (including no nail pitting), rashes, bruising, petechiae, sclerodactyly, digital pits, telangiectasias, tophi, appreciable calcinosis, or nodules.????  Comprehensive Musculoskeletal Examination:??  - Jaw, neck without limited ROM.??  - Shoulders, elbows, knees, ankles, feet/toes: No deformity, erythema, warmth, swelling, effusion, tenderness, or limited ROM.???  -   The grind test is positive on the left hand. On the right hand, there is a bony prominence in the dorsum of the hand, not fluctuant. The grind test on the right hand is less severe than on the left     - Lumbosacral spine and hips without limited ROM.?? Negative YORDY.    LABS AND IMAGING ll laboratory data was reviewed and relevant values are noted as below.    See pertinent labs in HPI  Assessment & Plan  Leann Moctezuma is a 54 y.o. female  who presents for evaluation  for positive BRAYAN, " Polyarthritis with elevated RF    The patient presents with an elevated rheumatoid factor (RF) of 188, which raises concerns for rheumatoid arthritis (RA). However, her current symptoms are more indicative of osteoarthritis (OA), particularly in the carpometacarpal (CMC) joint and the presence of Heberden's nodes. The possibility of intermittent episodic rheumatoid features can not be ruled out due to the high RF levels. At present, her symptoms align more with OA rather than RA. The BRAYAN test was positive with a low titer of 1:160, but there are no clinical signs suggestive of lupus or Sjogren's syndrome. The potential benefits and side effects of methotrexate were discussed, but she prefers to delay treatment until symptoms worsen due to her current medication load. A CCP test will be ordered to further investigate the elevated RF levels.      Plan:  - obtain labs, including CCP, complements dsDNA, URSULA  - Conservative treatments were discussed, including the use of topical NSAIDs like Voltaren gel, splints or braces to immobilize the hands during sleep, and paraffin oil soaks for nighttime relief.   - An x-ray of the hands will be ordered to further evaluate the condition.   - A prescription for Celebrex 200 mg will be provided to manage the hand pain. She is advised to monitor her blood pressure while on NSAIDs.  - Will discuss results with patient when results becomes available.    Follow-up  The patient will follow up in 12 weeks for reevaluation.    Diagnoses and all orders for this visit:    1. Positive BRAYAN (antinuclear antibody) (Primary)  2. Elevated rheumatoid factor     -     Cyclic Citrul Peptide Antibody, IgG / IgA  -     Comprehensive Metabolic Panel  -     CBC & Differential  -     Hepatitis C Antibody  -     C3 Complement  -     C4 Complement  -     Anti-DNA Antibody, Double-stranded  -     URSULA Antibody Panel  -     Urinalysis With Microscopic If Indicated (No Culture) - Urine, Clean Catch  -      Protein / Creatinine Ratio, Urine - Urine, Clean Catch  -     XR Wrist 3+ View Bilateral; Future  -     XR Hand 3+ View Bilateral; Future    3. Intermittent pain and swelling of hand  4. Inflammatory arthritis  -     Cyclic Citrul Peptide Antibody, IgG / IgA  -     Comprehensive Metabolic Panel  -     CBC & Differential  -     Hepatitis C Antibody  -     C3 Complement  -     C4 Complement  -     Anti-DNA Antibody, Double-stranded  -     URSULA Antibody Panel  -     Urinalysis With Microscopic If Indicated (No Culture) - Urine, Clean Catch  -     Protein / Creatinine Ratio, Urine - Urine, Clean Catch  -     XR Wrist 3+ View Bilateral; Future  -     XR Hand 3+ View Bilateral; Future    5. Osteoarthritis of carpometacarpal (CMC) joint of both thumbs  -     Cyclic Citrul Peptide Antibody, IgG / IgA  -     Comprehensive Metabolic Panel  -     CBC & Differential  -     Hepatitis C Antibody  -     C3 Complement  -     C4 Complement  -     Anti-DNA Antibody, Double-stranded  -     URSULA Antibody Panel  -     Urinalysis With Microscopic If Indicated (No Culture) - Urine, Clean Catch  -     Protein / Creatinine Ratio, Urine - Urine, Clean Catch  -     XR Wrist 3+ View Bilateral; Future  -     XR Hand 3+ View Bilateral; Future    Other orders  -     celecoxib (CeleBREX) 200 MG capsule; Take 1 capsule by mouth Daily.  Dispense: 30 capsule; Refill: 3    Patient or patient representative verbalized consent for the use of Ambient Listening during the visit with  Tahir Gomez MD for chart documentation.      I spent 60 minutes caring for Leann on this date of service. This time includes time spent by me in the following activities:preparing for the visit, reviewing tests, obtaining and/or reviewing a separately obtained history, performing a medically appropriate examination and/or evaluation , counseling and educating the patient/family/caregiver, ordering medications, tests, or procedures, documenting information in the medical  record, and independently interpreting results and communicating that information with the patient/family/caregiver

## 2025-04-02 ENCOUNTER — TELEPHONE (OUTPATIENT)
Age: 55
End: 2025-04-02
Payer: COMMERCIAL

## 2025-04-02 NOTE — TELEPHONE ENCOUNTER
Called to schedule patient for a 3 month follow up around 6/25/25. Patient politely declined to schedule at the moment. Patient said she would like to see how things go and would give the office a call back if she's needing a follow up.

## 2025-04-06 PROBLEM — M19.90 INFLAMMATORY ARTHRITIS: Status: ACTIVE | Noted: 2025-04-06

## 2025-04-06 PROBLEM — R76.8 ELEVATED RHEUMATOID FACTOR: Status: ACTIVE | Noted: 2025-04-06

## 2025-04-06 PROBLEM — M79.89 INTERMITTENT PAIN AND SWELLING OF HAND: Status: ACTIVE | Noted: 2025-04-06

## 2025-04-06 PROBLEM — M18.0 OSTEOARTHRITIS OF CARPOMETACARPAL (CMC) JOINT OF BOTH THUMBS: Status: ACTIVE | Noted: 2025-04-06

## 2025-04-06 PROBLEM — M79.643 INTERMITTENT PAIN AND SWELLING OF HAND: Status: ACTIVE | Noted: 2025-04-06

## 2025-04-06 NOTE — PATIENT INSTRUCTIONS
Seen today for elevated RF and hand pains with concern for rheumatoid arthritis  We will obtain some labs and Xrays today and will see you for follow up in 12 weeks

## 2025-04-16 ENCOUNTER — TELEPHONE (OUTPATIENT)
Dept: GASTROENTEROLOGY | Facility: CLINIC | Age: 55
End: 2025-04-16

## 2025-04-16 NOTE — TELEPHONE ENCOUNTER
Hub staff attempted to follow warm transfer process and was unsuccessful     Caller: Leann Moctezuma    Relationship to patient: Self    Best call back number: [phone]    Patient is needing: PT IS CALLING TO GET HER PREP PAPERWORK FOR HER PROCEDURE NEXT WEEK.    PT STATED WE CAN SEND PAPERWORK TO HER MY CHART.    PLEASE CALL AND ADVISE. IT'S OK TO M.

## 2025-04-22 ENCOUNTER — OFFICE VISIT (OUTPATIENT)
Dept: FAMILY MEDICINE CLINIC | Facility: CLINIC | Age: 55
End: 2025-04-22
Payer: COMMERCIAL

## 2025-04-22 VITALS
DIASTOLIC BLOOD PRESSURE: 72 MMHG | TEMPERATURE: 98.2 F | HEIGHT: 65 IN | BODY MASS INDEX: 34.59 KG/M2 | WEIGHT: 207.6 LBS | SYSTOLIC BLOOD PRESSURE: 110 MMHG | OXYGEN SATURATION: 95 % | HEART RATE: 78 BPM

## 2025-04-22 DIAGNOSIS — J30.9 CHRONIC ALLERGIC RHINITIS: ICD-10-CM

## 2025-04-22 DIAGNOSIS — T78.2XXD ANAPHYLAXIS, SUBSEQUENT ENCOUNTER: ICD-10-CM

## 2025-04-22 DIAGNOSIS — M05.80 POLYARTHRITIS WITH POSITIVE RHEUMATOID FACTOR: ICD-10-CM

## 2025-04-22 DIAGNOSIS — J32.9 CHRONIC SINUSITIS, UNSPECIFIED LOCATION: ICD-10-CM

## 2025-04-22 DIAGNOSIS — D72.829 LEUKOCYTOSIS, UNSPECIFIED TYPE: ICD-10-CM

## 2025-04-22 DIAGNOSIS — E66.811 OBESITY, CLASS I, BMI 30-34.9: ICD-10-CM

## 2025-04-22 DIAGNOSIS — E78.01 FAMILIAL HYPERCHOLESTEROLEMIA: ICD-10-CM

## 2025-04-22 DIAGNOSIS — I10 ESSENTIAL HYPERTENSION: Primary | ICD-10-CM

## 2025-04-22 DIAGNOSIS — R76.8 POSITIVE ANA (ANTINUCLEAR ANTIBODY): ICD-10-CM

## 2025-04-22 DIAGNOSIS — R79.89 ABNORMAL TSH: ICD-10-CM

## 2025-04-22 PROBLEM — E87.6 HYPOKALEMIA: Status: RESOLVED | Noted: 2025-02-19 | Resolved: 2025-04-22

## 2025-04-22 LAB
BUN SERPL-MCNC: 16 MG/DL (ref 6–20)
BUN/CREAT SERPL: 16.8 (ref 7–25)
CALCIUM SERPL-MCNC: 10.3 MG/DL (ref 8.6–10.5)
CHLORIDE SERPL-SCNC: 105 MMOL/L (ref 98–107)
CHOLEST SERPL-MCNC: 184 MG/DL (ref 0–200)
CO2 SERPL-SCNC: 26.7 MMOL/L (ref 22–29)
CREAT SERPL-MCNC: 0.95 MG/DL (ref 0.57–1)
EGFRCR SERPLBLD CKD-EPI 2021: 71.8 ML/MIN/1.73
ERYTHROCYTE [DISTWIDTH] IN BLOOD BY AUTOMATED COUNT: 12.3 % (ref 12.3–15.4)
GLUCOSE SERPL-MCNC: 96 MG/DL (ref 65–99)
HCT VFR BLD AUTO: 45.5 % (ref 34–46.6)
HDLC SERPL-MCNC: 45 MG/DL (ref 40–60)
HGB BLD-MCNC: 14.4 G/DL (ref 12–15.9)
LDLC SERPL CALC-MCNC: 116 MG/DL (ref 0–100)
LDLC/HDLC SERPL: 2.52 {RATIO}
MCH RBC QN AUTO: 27.3 PG (ref 26.6–33)
MCHC RBC AUTO-ENTMCNC: 31.6 G/DL (ref 31.5–35.7)
MCV RBC AUTO: 86.3 FL (ref 79–97)
PLATELET # BLD AUTO: 337 10*3/MM3 (ref 140–450)
POTASSIUM SERPL-SCNC: 5.1 MMOL/L (ref 3.5–5.2)
RBC # BLD AUTO: 5.27 10*6/MM3 (ref 3.77–5.28)
SODIUM SERPL-SCNC: 141 MMOL/L (ref 136–145)
T4 FREE SERPL-MCNC: 1.32 NG/DL (ref 0.92–1.68)
TRIGL SERPL-MCNC: 127 MG/DL (ref 0–150)
TSH SERPL DL<=0.005 MIU/L-ACNC: 2.03 UIU/ML (ref 0.27–4.2)
VLDLC SERPL CALC-MCNC: 23 MG/DL (ref 5–40)
WBC # BLD AUTO: 8.4 10*3/MM3 (ref 3.4–10.8)

## 2025-04-22 NOTE — PROGRESS NOTES
Chief Complaint  Hyperlipidemia (Check up did not get labs prior patient is fasting), Hypertension, Asthma, and Osteoarthritis    2-month follow-up on chronic sinusitis, allergic reaction/anaphylaxis to penicillin, electrolyte abnormalities, leukocytosis, positive BRAYAN, positive rheumatoid factor, inflammatory arthritis  And  6-month follow-up on HTN, hyperlipidemia, asthma, abnormal thyroid studies, obesity/weight management    Most recent visit with me about 2 months ago/February 2025 for an ER follow-up after anaphylactic reaction to penicillin, acute on chronic sinusitis, and multiple lab abnormalities  --Hospital records reviewed, treated with high-dose steroids.  Anaphylactic reaction with penicillin.  Also given EpiPen for future occurrences.  -- Treated for another acute on chronic sinusitis with doxycycline.  Strongly suggested that she follow back up with her ENT specialist  -- Repeated lab work due to relatively high WC and low K  -- Discussed moderately high BRAYAN and rheumatoid factor level were completed at previous visit due to suspicions of inflammatory arthritis, especially MCP joints?  Did strongly recommend rheumatology referral.    Repeat labs all improved, although still with some mild elevation with WBC.  Electrolytes resolved.  Did consult with rheumatologist, records reviewed below.  However, never did follow back up with her ENT specialist!!    Other interval history since last seen by me --- seen here again last month for an acute care visit with Dr. Duffy.  Also did consult with rheumatologist last month    Seen for acute care visit regarding influenza--- treated conservatively with Tamiflu.  Resolved  Office Visit with Jasmina Duffy MD (03/19/2025)     Consulted with RHEUMATOLOGIST ABOUT 3 TO 4 WEEKS ago  Office Visit with Tahir Gomez MD (03/25/2025) --- initial consultation regarding positive BRAYAN, moderate elevation with rheumatoid factor in the setting of polyarthritis.  No clear  etiology?  Suspect osteoarthritis of the MCP joints, yet borderline uncertainty with autoimmune/inflammatory?  Plan was to repeat additional labs and follow back up in 12 weeks.    At this point, she HAS NOT completed the lab work requested by rheumatologist and DOES NOT have a follow-up appointment with rheumatologist!?!  Although, she states she is finally feeling much better.    The addition of Astelin nasal spray seems to have really helped her chronic allergies/sinus.  Remains on Xyzal, Singulair, Flonase.  No increased use of her albuterol MDI.  Longstanding history of chronic sinusitis, allergic rhinitis.  Multiple rounds of antibiotics and steroids in recent years.  She does have an ENT specialist, Dr. Jasson Esquivel.  Last visit almost 2 years ago, S/P CT sinus done at that time that showed opacification.  However per patient's report his exam did not match up with CT scan findings??  Has not followed back up with Dr. Esquivel because he is now with [facility]??  Requesting new provider        Doing well.  Still working full-time.  Quit smoking over 1 year ago.  Trying to improve upon a healthier diet and more consistent cardio exercise.  Just recently got back on track about 4 to 6 weeks ago.  Weight does remain stable.  Mood remains stable.  Adequate sleep.  No chest pain, SOA, or edema.    No new complaints or concerns today.  Needs follow-up on recent LDCT, labs, specialist visits.  Has upcoming C-scope.  Due for repeat fasting labs.  Compliant with and tolerates current medications without side effects.  Maintains regular follow-up with psychiatrist, no changes with Adderall.          Review of Systems   Constitutional:  Negative for fever and unexpected weight change.   Respiratory:  Negative for cough and shortness of breath.    Cardiovascular:  Negative for chest pain.        Subjective          Leann Moctezuma presents to [facility]    Objective   Vital Signs:   Vitals:     "04/22/25 0913   BP: 110/72   BP Location: Right arm   Patient Position: Sitting   Cuff Size: Adult   Pulse: 78   Temp: 98.2 °F (36.8 °C)   SpO2: 95%   Weight: 94.2 kg (207 lb 9.6 oz)   Height: 165.1 cm (65\")      Body mass index is 34.55 kg/m².   Physical Exam  Vitals and nursing note reviewed.   Constitutional:       Appearance: Normal appearance. She is well-developed. She is obese.   HENT:      Head: Normocephalic and atraumatic.      Comments: Oropharynx --- chronic gray cobblestoning drainage.       Right Ear: Tympanic membrane normal.      Left Ear: Tympanic membrane normal.      Nose: Congestion present.   Eyes:      Conjunctiva/sclera: Conjunctivae normal.      Pupils: Pupils are equal, round, and reactive to light.   Neck:      Thyroid: No thyromegaly.   Cardiovascular:      Rate and Rhythm: Normal rate and regular rhythm.      Heart sounds: Normal heart sounds. No murmur heard.  Pulmonary:      Effort: Pulmonary effort is normal. No respiratory distress.      Breath sounds: Normal breath sounds. No wheezing or rales.   Abdominal:      General: Abdomen is flat. Bowel sounds are normal. There is no distension.      Palpations: Abdomen is soft. There is no hepatomegaly, splenomegaly or mass.      Tenderness: There is no abdominal tenderness. There is no guarding or rebound.      Hernia: No hernia is present.   Musculoskeletal:         General: Normal range of motion.      Cervical back: Normal range of motion and neck supple.      Right lower leg: No edema.      Left lower leg: No edema.   Lymphadenopathy:      Cervical: No cervical adenopathy.   Skin:     General: Skin is warm.   Neurological:      General: No focal deficit present.      Mental Status: She is alert.   Psychiatric:         Mood and Affect: Mood normal.         Behavior: Behavior normal.         Thought Content: Thought content normal.         Judgment: Judgment normal.        Result Review :     Common labs          2/16/2025    04:08 " 2/19/2025    09:52 4/22/2025    10:07   Common Labs   Glucose 139  78  96    BUN 13  19  16    Creatinine 0.76  1.06  0.95    Sodium 139  138  141    Potassium 4.0  5.2  5.1    Chloride 107  99  105    Calcium 9.3  9.9  10.3    WBC 25.90  14.63  8.40    Hemoglobin 12.3  14.4  14.4    Hematocrit 37.5  43.6  45.5    Platelets 336  390  337    Total Cholesterol   184    Triglycerides   127    HDL Cholesterol   45    LDL Cholesterol    116      Lipid Panel          10/9/2024    10:41 1/13/2025    10:37 4/22/2025    10:07   Lipid Panel   Total Cholesterol 256  206  184    Triglycerides 120  126  127    HDL Cholesterol 54  50  45    VLDL Cholesterol 21  23  23    LDL Cholesterol  181  133  116    LDL/HDL Ratio 3.30  2.62  2.52      TSH          10/16/2024    11:41 1/17/2025    10:57 4/22/2025    10:07   TSH   TSH 4.410  3.120  2.030            Lab Results   Component Value Date    ANADIRECT Positive (A) 10/16/2024    [id number] 38.8 01/17/2025    FOLATE >20.0 10/27/2023        CT Chest Low Dose Cancer Screening WO (03/03/2025 15:19) annual screening           Assessment and Plan    Diagnoses and all orders for this visit:    1. Essential hypertension (Primary) --controlled  No change with Benicar, refill upon request.  Check electrolytes  -     Basic Metabolic Panel    2. Familial hypercholesterolemia --controlled  Doing better with the addition of Zetia about 6 months ago.  Due to recheck lipids, TSH  Ideally goal LDL needs to be less than 100, at least less than 130 given history of HTN  If at goal then no change with Zetia 10 mg daily.  Otherwise may need to add statin?  Needs low-carb/low calorie/low cholesterol diet and increase cardio exercise to greater than 150 minutes weekly  -     Lipid Panel With LDL / HDL Ratio  -     TSH  -     T4, Free    3. Chronic sinusitis, unspecified location --S/P another current infection about 2 months ago.  Continues with multiple rounds of antibiotics for acute on chronic  sinusitis.  Requesting new specialist (ENT/Jasson Esquivel no longer independent?  With [facility]?)  Referral to allergist, given maximum allergy meds and still dermatic.  Also with recent episode of anaphylactic reaction to penicillin.  -     Ambulatory Referral to Allergy    4. Chronic allergic rhinitis --doing better with the addition of Astelin  Continue Singulair, Flonase, Xyzal, and Astelin.  See above plan, referral to allergist  -     Ambulatory Referral to Allergy    5. Anaphylaxis, subsequent encounter --new Dx about 2 months ago, S/P ER visit  Significant anaphylactic reaction to penicillin.  S/P high dose steroids, epinephrine.  Has EpiPen.  Referral to allergist.  See above plan regarding significant allergic rhinitis and recurrent sinusitis  -     Ambulatory Referral to Allergy    6. Leukocytosis, unspecified type --recheck today  Most likely this was due to recent acute on chronic sinusitis and high-dose steroids after anaphylactic reaction  -     CBC (No Diff)    7. Positive BRAYAN (antinuclear antibody) --mild/moderate, new Dx  With moderate multijoint inflammatory arthritis  Now seeing rheumatologist, workup in progress    8. Polyarthritis with positive rheumatoid factor --new Dx  S/P initial consultation with rheumatologist, records reviewed with patient today.  Plan was for additional lab work and follow-up in 12 weeks.  She HAS NOT done this yet, stressed the importance of completing this for complete workup given significant history and moderately abnormal lab findings    9. Abnormal TSH --recheck today.  Asymptomatic.    10. Obesity, Class I, BMI 30-34.9 --remains uncontrolled, BMI 34.5  Quit smoking 1 year ago, menopause.  Failed GLP-1 meds due to side effects.  Need to get back on track with healthier lifestyle---Needs low-carb/low calorie/low cholesterol diet and increase cardio exercise to greater than 150 minutes weekly    If all labs WNL and doing well then may follow-up in 6 months, due for  annual wellness?      I spent 40 minutes caring for Leann on this date of service. This time includes time spent by me in the following activities:preparing for the visit, reviewing tests, obtaining and/or reviewing a separately obtained history, performing a medically appropriate examination and/or evaluation , counseling and educating the patient/family/caregiver, ordering medications, tests, or procedures, referring and communicating with other health care professionals , documenting information in the medical record, care coordination, and extensive education, counseling, and management of multiple new and chronic active diagnoses all addressed today, x 10 diagnoses  Follow Up   Return in about 6 months (around 10/22/2025) for Recheck.  Patient was given instructions and counseling regarding her condition or for health maintenance advice. Please see specific information pulled into the AVS if appropriate.

## 2025-04-22 NOTE — PATIENT INSTRUCTIONS
Referral to allergist    Please get the lab work and follow back up with rheumatologist as planned    Continue with new healthy lifestyle ---Needs low-carb/low calorie/low cholesterol diet and increase cardio exercise to greater than 150 minutes weekly

## 2025-04-23 ENCOUNTER — LAB REQUISITION (OUTPATIENT)
Dept: LAB | Facility: HOSPITAL | Age: 55
End: 2025-04-23
Payer: COMMERCIAL

## 2025-04-23 ENCOUNTER — OUTSIDE FACILITY SERVICE (OUTPATIENT)
Dept: GASTROENTEROLOGY | Facility: CLINIC | Age: 55
End: 2025-04-23
Payer: COMMERCIAL

## 2025-04-23 DIAGNOSIS — Z86.0100 PERSONAL HISTORY OF COLON POLYPS, UNSPECIFIED: ICD-10-CM

## 2025-04-23 DIAGNOSIS — K64.0 FIRST DEGREE HEMORRHOIDS: ICD-10-CM

## 2025-04-23 DIAGNOSIS — D12.8 BENIGN NEOPLASM OF RECTUM: ICD-10-CM

## 2025-04-23 PROCEDURE — 88305 TISSUE EXAM BY PATHOLOGIST: CPT | Performed by: INTERNAL MEDICINE

## 2025-04-23 PROCEDURE — 45385 COLONOSCOPY W/LESION REMOVAL: CPT | Performed by: INTERNAL MEDICINE

## 2025-04-24 LAB
CYTO UR: NORMAL
LAB AP CASE REPORT: NORMAL
LAB AP CLINICAL INFORMATION: NORMAL
PATH REPORT.FINAL DX SPEC: NORMAL
PATH REPORT.GROSS SPEC: NORMAL

## 2025-06-03 ENCOUNTER — OFFICE VISIT (OUTPATIENT)
Dept: FAMILY MEDICINE CLINIC | Facility: CLINIC | Age: 55
End: 2025-06-03
Payer: COMMERCIAL

## 2025-06-03 VITALS
DIASTOLIC BLOOD PRESSURE: 64 MMHG | SYSTOLIC BLOOD PRESSURE: 100 MMHG | BODY MASS INDEX: 34.69 KG/M2 | HEIGHT: 65 IN | HEART RATE: 74 BPM | TEMPERATURE: 98.2 F | WEIGHT: 208.2 LBS | OXYGEN SATURATION: 97 %

## 2025-06-03 DIAGNOSIS — J45.20 MILD INTERMITTENT ASTHMA WITHOUT COMPLICATION: ICD-10-CM

## 2025-06-03 DIAGNOSIS — R05.1 ACUTE COUGH: ICD-10-CM

## 2025-06-03 DIAGNOSIS — R09.82 ALLERGIC RHINITIS WITH POSTNASAL DRIP: ICD-10-CM

## 2025-06-03 DIAGNOSIS — J30.9 ALLERGIC RHINITIS WITH POSTNASAL DRIP: ICD-10-CM

## 2025-06-03 DIAGNOSIS — J32.9 CHRONIC SINUSITIS, UNSPECIFIED LOCATION: Primary | ICD-10-CM

## 2025-06-03 RX ORDER — METHYLPREDNISOLONE 4 MG/1
TABLET ORAL
Qty: 1 EACH | Refills: 0 | Status: CANCELLED | OUTPATIENT
Start: 2025-06-03

## 2025-06-03 RX ORDER — HYDROCODONE BITARTRATE AND HOMATROPINE METHYLBROMIDE ORAL SOLUTION 5; 1.5 MG/5ML; MG/5ML
LIQUID ORAL EVERY 6 HOURS PRN
Refills: 0 | Status: CANCELLED | OUTPATIENT
Start: 2025-06-03

## 2025-06-03 RX ORDER — METHYLPREDNISOLONE SODIUM SUCCINATE 40 MG/ML
80 INJECTION INTRAMUSCULAR; INTRAVENOUS ONCE
Status: COMPLETED | OUTPATIENT
Start: 2025-06-03 | End: 2025-06-03

## 2025-06-03 RX ORDER — LEVOFLOXACIN 750 MG/1
750 TABLET, FILM COATED ORAL DAILY
Qty: 10 TABLET | Refills: 0 | Status: CANCELLED | OUTPATIENT
Start: 2025-06-03

## 2025-06-03 RX ORDER — HYDROCODONE POLISTIREX AND CHLORPHENIRAMINE POLISTIREX 10; 8 MG/5ML; MG/5ML
5 SUSPENSION, EXTENDED RELEASE ORAL EVERY 12 HOURS PRN
Qty: 45 ML | Refills: 0 | Status: SHIPPED | OUTPATIENT
Start: 2025-06-03

## 2025-06-03 RX ORDER — DOXYCYCLINE 100 MG/1
100 CAPSULE ORAL 2 TIMES DAILY
Qty: 20 CAPSULE | Refills: 0 | Status: SHIPPED | OUTPATIENT
Start: 2025-06-03

## 2025-06-03 RX ORDER — BENZONATATE 100 MG/1
100 CAPSULE ORAL 3 TIMES DAILY PRN
Status: CANCELLED | OUTPATIENT
Start: 2025-06-03

## 2025-06-03 RX ORDER — ALBUTEROL SULFATE 90 UG/1
2 INHALANT RESPIRATORY (INHALATION)
Qty: 18 G | Refills: 1 | Status: CANCELLED | OUTPATIENT
Start: 2025-06-03

## 2025-06-03 RX ORDER — AZELASTINE 1 MG/ML
2 SPRAY, METERED NASAL 2 TIMES DAILY
Qty: 1 EACH | Refills: 3 | Status: CANCELLED | OUTPATIENT
Start: 2025-06-03

## 2025-06-03 RX ADMIN — METHYLPREDNISOLONE SODIUM SUCCINATE 80 MG: 40 INJECTION INTRAMUSCULAR; INTRAVENOUS at 12:35

## 2025-06-03 NOTE — ASSESSMENT & PLAN NOTE
Sees ENT/Dr. Esquivel, 2023----S/P CT sinus with opacification, yet nasopharyngoscopy within normal limits (does not match) recommendations for allergy shots, however declines.  Doing better on a compounding wash as prescribed by ENT

## 2025-06-03 NOTE — PATIENT INSTRUCTIONS
May start doxycycline twice a day x 10 days    May take cough medicine at bedtime for cough    Follow-up with allergist for consultation

## 2025-06-03 NOTE — PROGRESS NOTES
"Chief Complaint  Sinusitis (Bad nasal congestion sinus pressure symptoms x 5 days ) and Cough (Cough all night with the drainage)      ACUTE CARE/work in related appointment made for bad nasal congestion and sinus pressure    Complains of \"another sinus infection\" x 5 to 6 days.  Started with lots of nasal congestion, sinus pressure, sinus headache, and cough.  All of which her symptoms are getting worse.  Today with colored nasal drainage.  Bad cough keeping her up at night, although not productive.  No fever, nausea, vomiting.  No increased use of albuterol, known mild intermittent asthma.  Remains on Singulair.    Good compliance with all her nasal sprays.  Significant history of chronic allergic rhinitis.  Seen ENT in recent past, S/P CT scans and nasopharyngoscopy.  Plan was to start allergy injections soon, referral placed at last visit.  However she has not made the appointment yet.    Longstanding history of acute on chronic sinusitis.  Although she has done better since using all her nasal sprays and antihistamine on a more consistent basis.  Also improved since she quit smoking.  Last acute sinusitis was about 4 months ago, treated with doxycycline.          Review of Systems   Constitutional:  Negative for fever and unexpected weight change.   HENT:  Positive for congestion, postnasal drip, sinus pressure, sinus pain and sore throat.    Respiratory:  Positive for cough. Negative for shortness of breath.    Cardiovascular:  Negative for chest pain.        Subjective          Leann CLARK Self presents to [facility]    Objective   Vital Signs:   Vitals:    06/03/25 1056   BP: 100/64   BP Location: Right arm   Patient Position: Sitting   Cuff Size: Adult   Pulse: 74   Temp: 98.2 °F (36.8 °C)   SpO2: 97%   Weight: 94.4 kg (208 lb 3.2 oz)   Height: 165.1 cm (65\")      Body mass index is 34.65 kg/m².   Physical Exam  Vitals and nursing note reviewed.   Constitutional:       General: She " is not in acute distress.     Appearance: Normal appearance. She is well-developed. She is not ill-appearing.   HENT:      Head: Normocephalic and atraumatic.      Comments: Frontal and maxillary tenderness   Oropharynx --gray cobblestoning drainage       Right Ear: Tympanic membrane normal.      Left Ear: Tympanic membrane normal.      Nose: Congestion present.   Neck:      Thyroid: No thyromegaly.   Cardiovascular:      Rate and Rhythm: Normal rate and regular rhythm.      Heart sounds: Normal heart sounds.   Pulmonary:      Effort: Pulmonary effort is normal. No respiratory distress.      Breath sounds: Normal breath sounds. No wheezing or rales.   Abdominal:      General: Abdomen is flat. Bowel sounds are normal. There is no distension.      Palpations: Abdomen is soft.      Tenderness: There is no abdominal tenderness.   Musculoskeletal:      Cervical back: Normal range of motion and neck supple.      Right lower leg: No edema.      Left lower leg: No edema.   Lymphadenopathy:      Cervical: No cervical adenopathy.   Neurological:      Mental Status: She is alert.        Result Review :           Lab Results   Component Value Date    ANADIRECT Positive (A) 10/16/2024    [id number] 38.8 01/17/2025    FOLATE >20.0 10/27/2023                   Assessment and Plan    Diagnoses and all orders for this visit:    1. Chronic sinusitis, unspecified location (Primary) --acute on chronic sinusitis, uncontrolled  Need to treat acute sinusitis, symptomatic x 5 to 6 days and not getting better.  See above HPI.  May start doxycycline 100 mg BID x 10 days  Also, given moderately severe nasal congestion and chronic history, will administer Solu-Medrol 80 mg IM  Assessment & Plan:  Seen ENT/Dr. Esquivel, 2023----S/P CT sinus with opacification, yet nasopharyngoscopy within normal limits (does not match) recommendations for allergy shots, however declined referral in the past.  Agreed at last appointment to see allergist.  Referral  has been placed, but still has not scheduled appointment.  Referral to Allergy for Chronic sinusitis, unspecified location; Chronic allergic rhinitis; Anaphylaxis, subsequent encounter (04/22/2025)     Orders:  -     Hydrocod Abdias-Chlorphe Abdias ER (TUSSIONEX PENNKINETIC) 10-8 MG/5ML ER suspension; Take 5 mL by mouth Every 12 (Twelve) Hours As Needed for Cough.  Dispense: 45 mL; Refill: 0    2. Acute cough --uncontrolled  Suspect this is more due to significant drainage from her acute on chronic sinusitis and postnasal drip.  Failed OTC meds.  May refill Tussionex as directed below, only to take nightly.  Acute treatment.  Pete reviewed.  Low risk patient.  -     Hydrocod Abdias-Chlorphe Abdias ER (TUSSIONEX PENNKINETIC) 10-8 MG/5ML ER suspension; Take 5 mL by mouth Every 12 (Twelve) Hours As Needed for Cough.  Dispense: 45 mL; Refill: 0    3. Allergic rhinitis with postnasal drip --uncontrolled  Most likely due to acute sinusitis.  Good compliance with all nasal sprays.  Needs to continue Astelin, Flonase, antihistamine.  Again, stressed the importance that she really does need to make that appointment with allergist.  Referral has been placed  Solu-Medrol 80 mg IM    4. Mild intermittent asthma without complication --stable  Continue Singulair and albuterol as needed.    Other orders  -     doxycycline (VIBRAMYCIN) 100 MG capsule; Take 1 capsule by mouth 2 (Two) Times a Day.  Dispense: 20 capsule; Refill: 0        Follow Up   Return if symptoms worsen or fail to improve, for Keep regular appointment as planned.  Patient was given instructions and counseling regarding her condition or for health maintenance advice. Please see specific information pulled into the AVS if appropriate.

## 2025-07-10 RX ORDER — OLMESARTAN MEDOXOMIL AND HYDROCHLOROTHIAZIDE 40/25 40; 25 MG/1; MG/1
1 TABLET ORAL DAILY
Qty: 90 TABLET | Refills: 1 | Status: SHIPPED | OUTPATIENT
Start: 2025-07-10

## 2025-07-18 ENCOUNTER — TELEPHONE (OUTPATIENT)
Dept: OBSTETRICS AND GYNECOLOGY | Age: 55
End: 2025-07-18

## 2025-07-21 ENCOUNTER — HOSPITAL ENCOUNTER (OUTPATIENT)
Facility: HOSPITAL | Age: 55
Discharge: HOME | End: 2025-07-21
Payer: COMMERCIAL

## 2025-07-22 RX ORDER — EZETIMIBE 10 MG/1
10 TABLET ORAL DAILY
Qty: 30 TABLET | Refills: 5 | Status: SHIPPED | OUTPATIENT
Start: 2025-07-22

## 2025-08-22 RX ORDER — OMEPRAZOLE 40 MG/1
40 CAPSULE, DELAYED RELEASE ORAL EVERY 12 HOURS SCHEDULED
Qty: 60 CAPSULE | Refills: 12 | OUTPATIENT
Start: 2025-08-22

## 2025-08-22 RX ORDER — MONTELUKAST SODIUM 10 MG/1
TABLET ORAL
Qty: 90 TABLET | Refills: 1 | Status: SHIPPED | OUTPATIENT
Start: 2025-08-22